# Patient Record
Sex: FEMALE | Race: WHITE | Employment: FULL TIME | URBAN - METROPOLITAN AREA
[De-identification: names, ages, dates, MRNs, and addresses within clinical notes are randomized per-mention and may not be internally consistent; named-entity substitution may affect disease eponyms.]

---

## 2017-06-20 PROBLEM — Z34.00 FIRST PREGNANCY: Status: ACTIVE | Noted: 2017-06-20

## 2017-09-19 PROBLEM — O35.9XX0 SUSPECTED FETAL ANOMALY, ANTEPARTUM: Status: ACTIVE | Noted: 2017-09-19

## 2017-09-19 PROBLEM — O09.92 SUPERVISION OF HIGH RISK PREGNANCY IN SECOND TRIMESTER: Status: ACTIVE | Noted: 2017-09-19

## 2017-12-28 ENCOUNTER — HOSPITAL ENCOUNTER (EMERGENCY)
Age: 27
Discharge: HOME | End: 2017-12-28
Attending: OBSTETRICS & GYNECOLOGY | Admitting: OBSTETRICS & GYNECOLOGY
Payer: COMMERCIAL

## 2017-12-28 VITALS
TEMPERATURE: 98.6 F | DIASTOLIC BLOOD PRESSURE: 75 MMHG | HEART RATE: 85 BPM | HEIGHT: 68 IN | RESPIRATION RATE: 18 BRPM | SYSTOLIC BLOOD PRESSURE: 127 MMHG | BODY MASS INDEX: 33.75 KG/M2

## 2017-12-28 DIAGNOSIS — O26.893 HEADACHE IN PREGNANCY, ANTEPARTUM, THIRD TRIMESTER: Primary | ICD-10-CM

## 2017-12-28 DIAGNOSIS — R51.9 HEADACHE IN PREGNANCY, ANTEPARTUM, THIRD TRIMESTER: Primary | ICD-10-CM

## 2017-12-28 LAB
ALBUMIN SERPL-MCNC: 2.3 G/DL (ref 3.5–5)
ALBUMIN/GLOB SERPL: 0.7 {RATIO} (ref 1.2–3.5)
ALP SERPL-CCNC: 151 U/L (ref 50–136)
ALT SERPL-CCNC: 20 U/L (ref 12–65)
ANION GAP SERPL CALC-SCNC: 13 MMOL/L (ref 7–16)
AST SERPL-CCNC: 17 U/L (ref 15–37)
BASOPHILS # BLD: 0 K/UL (ref 0–0.2)
BASOPHILS NFR BLD: 0 % (ref 0–2)
BILIRUB SERPL-MCNC: 0.2 MG/DL (ref 0.2–1.1)
BUN SERPL-MCNC: 6 MG/DL (ref 6–23)
CALCIUM SERPL-MCNC: 8.4 MG/DL (ref 8.3–10.4)
CHLORIDE SERPL-SCNC: 107 MMOL/L (ref 98–107)
CO2 SERPL-SCNC: 21 MMOL/L (ref 21–32)
CREAT SERPL-MCNC: 0.68 MG/DL (ref 0.6–1)
DIFFERENTIAL METHOD BLD: ABNORMAL
EOSINOPHIL # BLD: 0.1 K/UL (ref 0–0.8)
EOSINOPHIL NFR BLD: 1 % (ref 0.5–7.8)
ERYTHROCYTE [DISTWIDTH] IN BLOOD BY AUTOMATED COUNT: 12.6 % (ref 11.9–14.6)
GLOBULIN SER CALC-MCNC: 3.5 G/DL (ref 2.3–3.5)
GLUCOSE SERPL-MCNC: 106 MG/DL (ref 65–100)
HCT VFR BLD AUTO: 31.9 % (ref 35.8–46.3)
HGB BLD-MCNC: 10.8 G/DL (ref 11.7–15.4)
IMM GRANULOCYTES # BLD: 0 K/UL (ref 0–0.5)
IMM GRANULOCYTES NFR BLD AUTO: 1 % (ref 0–5)
LYMPHOCYTES # BLD: 1.8 K/UL (ref 0.5–4.6)
LYMPHOCYTES NFR BLD: 20 % (ref 13–44)
MCH RBC QN AUTO: 30.4 PG (ref 26.1–32.9)
MCHC RBC AUTO-ENTMCNC: 33.9 G/DL (ref 31.4–35)
MCV RBC AUTO: 89.9 FL (ref 79.6–97.8)
MONOCYTES # BLD: 0.6 K/UL (ref 0.1–1.3)
MONOCYTES NFR BLD: 7 % (ref 4–12)
NEUTS SEG # BLD: 6.2 K/UL (ref 1.7–8.2)
NEUTS SEG NFR BLD: 71 % (ref 43–78)
PLATELET # BLD AUTO: 171 K/UL (ref 150–450)
PMV BLD AUTO: 10.3 FL (ref 10.8–14.1)
POTASSIUM SERPL-SCNC: 3.8 MMOL/L (ref 3.5–5.1)
PROT SERPL-MCNC: 5.8 G/DL (ref 6.3–8.2)
RBC # BLD AUTO: 3.55 M/UL (ref 4.05–5.25)
SODIUM SERPL-SCNC: 141 MMOL/L (ref 136–145)
URATE SERPL-MCNC: 3.8 MG/DL (ref 2.6–6)
WBC # BLD AUTO: 8.6 K/UL (ref 4.3–11.1)

## 2017-12-28 PROCEDURE — 99285 EMERGENCY DEPT VISIT HI MDM: CPT

## 2017-12-28 PROCEDURE — 59025 FETAL NON-STRESS TEST: CPT

## 2017-12-28 PROCEDURE — 84550 ASSAY OF BLOOD/URIC ACID: CPT | Performed by: OBSTETRICS & GYNECOLOGY

## 2017-12-28 PROCEDURE — 80053 COMPREHEN METABOLIC PANEL: CPT | Performed by: OBSTETRICS & GYNECOLOGY

## 2017-12-28 PROCEDURE — 85025 COMPLETE CBC W/AUTO DIFF WBC: CPT | Performed by: OBSTETRICS & GYNECOLOGY

## 2017-12-28 RX ORDER — HYDROCODONE BITARTRATE AND ACETAMINOPHEN 5; 325 MG/1; MG/1
1 TABLET ORAL
Qty: 20 TAB | Refills: 0 | Status: SHIPPED | OUTPATIENT
Start: 2017-12-28 | End: 2018-01-18

## 2017-12-28 NOTE — PROGRESS NOTES
24 hour urine process explained to patient and spouse  Patient to start collection in AM and discard first void and collect urine X 24 hours after. Patient to return 24 hour urine collect Saturday morning to [facility] lab. OB hospitalist to follow up on results  Urine jug and urine hat given.   V/U of process    Awaiting lab results

## 2017-12-28 NOTE — IP AVS SNAPSHOT
[address] 
[phone] Patient: Germán Pearson MRN: [id number] SRE:[id number] My Medications TAKE these medications as instructed Instructions Each Dose to Equal  
 Morning Noon Evening Bedtime  
 aspirin delayed-release 81 mg tablet Your last dose was: Your next dose is: Take  by mouth daily. butalbital-acetaminophen-caffeine -40 mg per tablet Commonly known as:  Lavella Amble Your last dose was: Your next dose is: Take 1-2 Tabs by mouth every six (6) hours as needed for Pain. Max Daily Amount: 8 Tabs. 1-2 Tab HYDROcodone-acetaminophen 5-325 mg per tablet Commonly known as:  Gaye Arts Your last dose was: Your next dose is: Take 1 Tab by mouth every four (4) hours as needed for Pain (headache). Max Daily Amount: 6 Tabs. Indications: Pain 1 Tab MIRALAX 17 gram packet Generic drug:  polyethylene glycol Your last dose was: Your next dose is: Take 17 g by mouth daily. 17 g PNV 67-iron ps-folate no. 1-dha 29 mg iron- 1 mg-200 mg Cap Commonly known as:  Charlesetta Pata Your last dose was: Your next dose is: Take 1 Cap by mouth daily. 1 Cap TYLENOL 325 mg tablet Generic drug:  acetaminophen Your last dose was: Your next dose is: Take  by mouth every four (4) hours as needed for Pain. VITAMIN D3 2,000 unit Tab Generic drug:  cholecalciferol (vitamin D3) Your last dose was: Your next dose is: Take  by mouth. Where to Get Your Medications Information on where to get these meds will be given to you by the nurse or doctor. ! Ask your nurse or doctor about these medications HYDROcodone-acetaminophen 5-325 mg per tablet

## 2017-12-28 NOTE — IP AVS SNAPSHOT
Summary of Care Report The Summary of Care report has been created to help improve care coordination. Users with access to [employer] or [address] (Web-based application) may access additional patient information including the Discharge Summary. If you are not currently a [address] user and need more information, please call the number listed below in the [place] [address] section and ask to be connected with Medical Records. Facility Information Name Address Phone [address] [place] [address] [address] [place] [id number] [phone] Patient Information Patient Name Sex NOY Sidhu ([id number]) Female 1990 Discharge Information Admitting Provider Service Area Unit Gloria Srinivasan MD / [address] [facility] [address] / [phone] Discharge Provider Discharge Date/Time Discharge Disposition Destination (none) 2017 (Pending) AHR (none) Patient Language Language ENGLISH [13] Hospital Problems as of 2017  Reviewed: 2017 10:21 AM by Merly Craft MD  
  
  
  
 Class Noted - Resolved Last Modified POA Active Problems Headache in pregnancy, antepartum, third trimester  2017 - Present 2017 by Gloria Srinivasan MD Unknown Entered by Gloria Srinivasan MD  
  
Non-Hospital Problems as of 2017  Reviewed: 2017 10:21 AM by Merly Craft MD  
  
  
  
 Class Noted - Resolved Last Modified Active Problems First pregnancy  2017 - Present 2017 by Merly Craft MD  
  Entered by Selma Jhaveri MD  
  Overview Addendum 2017  9:09 AM by Selma Jhaveri MD  
   1)hx anxiety/depression. Never found a med that worked well 2)hx anemia. Possibly due to heavy menses 3)prenatal labs nl 
4)breast reduction 5)started baby asa and D   Stop asa at [facility] Supervision of high risk pregnancy in second trimester  9/19/2017 - Present 9/19/2017 by Thanh Sotelo MD  
  Entered by Ca Echavarria Overview Addendum 9/19/2017 10:14 AM by Ca Echavarria 9/19/2017 at [facility]:  Pt for consult: positive EIF on ultrasound in OB office. Suspected fetal anomaly, antepartum  9/19/2017 - Present 12/8/2017 by Rafael German MD  
  Entered by Ca Echavarria Overview Addendum 12/8/2017  9:04 AM by Rafael German MD  
   9/19/2017 at [facility]: Sent for 2 EIFs, AFP Tetra Screen Negative on 8/29/2017. AC 84%, UA Dopplers WNL. Normal anatomy and normal heart except EIF in both vents. CL  4.2. Fetal echo done due to echogenic papillary muscle noted on ultrasound that changes the risk of Down's Syndrome from 1/990 to 1/500. Genetic counseling performed, Declines genetic testing. Questionable EDC, please review, EDC based on fetal measurements ahead. I reviewed. We have good early dating. I don't see that anything needs to be changed. · No MFM f/u recommended. You are allergic to the following Allergen Reactions Pcn (Penicillins) Hives Current Discharge Medication List  
  
START taking these medications Dose & Instructions Dispensing Information Comments HYDROcodone-acetaminophen 5-325 mg per tablet Commonly known as:  Cheryl Kind Dose:  1 Tab Take 1 Tab by mouth every four (4) hours as needed for Pain (headache). Max Daily Amount: 6 Tabs. Indications: Pain Quantity:  20 Tab Refills:  0 CONTINUE these medications which have NOT CHANGED Dose & Instructions Dispensing Information Comments  
 aspirin delayed-release 81 mg tablet Take  by mouth daily. Refills:  0  
   
 butalbital-acetaminophen-caffeine -40 mg per tablet Commonly known as:  Othelia Rising Dose:  1-2 Tab Take 1-2 Tabs by mouth every six (6) hours as needed for Pain. Max Daily Amount: 8 Tabs. Quantity:  30 Tab Refills:  2 MIRALAX 17 gram packet Generic drug:  polyethylene glycol Dose:  17 g Take 17 g by mouth daily. Refills:  0  
   
 PNV 67-iron ps-folate no. 1-dha 29 mg iron- 1 mg-200 mg Cap Commonly known as:  Shon Lint Dose:  1 Cap Take 1 Cap by mouth daily. Quantity:  30 Cap Refills:  11 TYLENOL 325 mg tablet Generic drug:  acetaminophen Take  by mouth every four (4) hours as needed for Pain. Refills:  0  
   
 VITAMIN D3 2,000 unit Tab Generic drug:  cholecalciferol (vitamin D3) Take  by mouth. Refills:  0 Follow-up Information Follow up With Details Comments Contact Info Phys Other, MD   Patient can only remember the practice name and not the physician Discharge Instructions High Blood Pressure in Pregnancy: Care Instructions Your Care Instructions High blood pressure (hypertension) means that the force of blood against your artery walls is too strong. Mild high blood pressure during pregnancy is not usually dangerous. Your doctor will probably just want to watch you closely. But when blood pressure is very high, it can reduce oxygen to your baby. This can affect how well your baby grows. High blood pressure also means that you are at higher risk for: · Preeclampsia. This is a problem that includes high blood pressure and damage to your liver or kidneys. It can also reduce how much oxygen your baby gets. In some cases, it leads to eclampsia. Eclampsia causes seizures. · Placental abruption. This is a problem when the placenta separates from the uterus before birth. It prevents the baby from getting enough oxygen and nutrients. Sometimes it can cause death for the baby and the mother.  
To prevent problems for you or your baby, you will have to check your blood pressure often. You will do this until after your baby is born. If your blood pressure rises suddenly or is very high during your pregnancy, your doctor may prescribe medicines. They can usually control blood pressure. If your blood pressure affects your or your baby's health, your doctor may need to deliver your baby early. After your baby is born, your blood pressure will probably improve. But sometimes blood pressure problems continue after birth. Follow-up care is a key part of your treatment and safety. Be sure to make and go to all appointments, and call your doctor if you are having problems. It's also a good idea to know your test results and keep a list of the medicines you take. How can you care for yourself at home? · Take and write down your blood pressure at home if your doctor tells you to. · Take your medicines exactly as prescribed. Call your doctor if you think you are having a problem with your medicine. · Do not smoke. If you need help quitting, talk to your doctor about stop-smoking programs and medicines. These can increase your chances of quitting for good. · Do not gain too much weight during your pregnancy. Talk to your doctor about how much weight gain is healthy. · Eat a healthy diet. · If your doctor says it's okay, get regular exercise. Walking or swimming several times a week can be healthy for you and your baby. · Reduce stress, and find time to relax. This is very important if you continue to work or have a busy schedule. It's also important if you have small children at home. When should you call for help? Call 911 anytime you think you may need emergency care. For example, call if: 
? · You passed out (lost consciousness). ? · You have a seizure. ?Call your doctor now or seek immediate medical care if: 
? · You have symptoms of preeclampsia, such as: 
¨ Sudden swelling of your face, hands, or feet. ¨ New vision problems (such as dimness or blurring). ¨ A severe headache. ? · Your blood pressure is higher than it should be or rises suddenly. ? · You have new nausea or vomiting. ? · You think that you are in labor. ? · You have pain in your belly or pelvis. ? Watch closely for changes in your health, and be sure to contact your doctor if: 
? · You gain weight rapidly. Where can you learn more? Go to http://edmar-werner.info/. Enter [phone] in the search box to learn more about \"High Blood Pressure in Pregnancy: Care Instructions. \" Current as of: 2017 Content Version: 11.4 © 6326-2368 [employer]. Care instructions adapted under license by [employer] (which disclaims liability or warranty for this information). If you have questions about a medical condition or this instruction, always ask your healthcare professional. [address] any warranty or liability for your use of this information. Pregnancy Precautions: Care Instructions Your Care Instructions There is no sure way to prevent labor before your due date ( labor) or to prevent most other pregnancy problems. But there are things you can do to increase your chances of a healthy pregnancy. Go to your appointments, follow your doctor's advice, and take good care of yourself. Eat well, and exercise (if your doctor agrees). And make sure to drink plenty of water. Follow-up care is a key part of your treatment and safety. Be sure to make and go to all appointments, and call your doctor if you are having problems. It's also a good idea to know your test results and keep a list of the medicines you take. How can you care for yourself at home? · Make sure you go to your prenatal appointments. At each visit, your doctor will check your blood pressure. Your doctor will also check to see if you have protein in your urine.  High blood pressure and protein in urine are signs of preeclampsia. This condition can be dangerous for you and your baby. · Drink plenty of fluids, enough so that your urine is light yellow or clear like water. Dehydration can cause contractions. If you have kidney, heart, or liver disease and have to limit fluids, talk with your doctor before you increase the amount of fluids you drink. · Tell your doctor right away if you notice any symptoms of an infection, such as: ¨ Burning when you urinate. ¨ A foul-smelling discharge from your vagina. ¨ Vaginal itching. ¨ Unexplained fever. ¨ Unusual pain or soreness in your uterus or lower belly. · Eat a balanced diet. Include plenty of foods that are high in calcium and iron. ¨ Foods high in calcium include milk, cheese, yogurt, almonds, and broccoli. ¨ Foods high in iron include red meat, shellfish, poultry, eggs, beans, raisins, whole-grain bread, and leafy green vegetables. · Do not smoke. If you need help quitting, talk to your doctor about stop-smoking programs and medicines. These can increase your chances of quitting for good. · Do not drink alcohol or use illegal drugs. · Follow your doctor's directions about activity. Your doctor will let you know how much, if any, exercise you can do. · Ask your doctor if you can have sex. If you are at risk for early labor, your doctor may ask you to not have sex. · Take care to prevent falls. During pregnancy, your joints are loose, and your balance is off. Sports such as bicycling, skiing, or in-line skating can increase your risk of falling. And don't ride horses or motorcycles, dive, water ski, scuba dive, or parachute jump while you are pregnant. · Avoid getting very hot. Do not use saunas or hot tubs. Avoid staying out in the sun in hot weather for long periods. Take acetaminophen (Tylenol) to lower a high fever.  
· Do not take any over-the-counter or herbal medicines or supplements without talking to your doctor or pharmacist first. 
 When should you call for help? Call 911 anytime you think you may need emergency care. For example, call if: 
? · You passed out (lost consciousness). ? · You have severe vaginal bleeding. ? · You have severe pain in your belly or pelvis. ? · You have had fluid gushing or leaking from your vagina and you know or think the umbilical cord is bulging into your vagina. If this happens, immediately get down on your knees so your rear end (buttocks) is higher than your head. This will decrease the pressure on the cord until help arrives. ?Call your doctor now or seek immediate medical care if: 
? · You have signs of preeclampsia, such as: 
¨ Sudden swelling of your face, hands, or feet. ¨ New vision problems (such as dimness or blurring). ¨ A severe headache. ? · You have any vaginal bleeding. ? · You have belly pain or cramping. ? · You have a fever. ? · You have had regular contractions (with or without pain) for an hour. This means that you have 8 or more within 1 hour or 4 or more in 20 minutes after you change your position and drink fluids. ? · You have a sudden release of fluid from your vagina. ? · You have low back pain or pelvic pressure that does not go away. ? · You notice that your baby has stopped moving or is moving much less than normal. ? Watch closely for changes in your health, and be sure to contact your doctor if you have any problems. Where can you learn more? Go to http://edmar-werner.info/. Enter [id number] in the search box to learn more about \"Pregnancy Precautions: Care Instructions. \" Current as of: March 16, 2017 Content Version: 11.4 © 3209-2266 [employer]. Care instructions adapted under license by [employer] (which disclaims liability or warranty for this information).  If you have questions about a medical condition or this instruction, always ask your healthcare professional. [employer] disclaims any warranty or liability for your use of this information. Chart Review Routing History No Routing History on File

## 2017-12-28 NOTE — ED PROVIDER NOTES
Chief Complaint:headache and swelling      33 y.o. female at 35w1d  weeks gestation who is seen for swelling and headache. Pt reports 15 pound weight gain over past 2 weeks. Headache not relieved with fioricett. No shortness of breath or cough, Some vomiting at night. No abd pain. No loss of fluid or vag bleeding. No contractions. No visual changes. HISTORY:    History   Sexual Activity    Sexual activity: Yes    Partners: Male    Birth control/ protection: None     Patient's last menstrual period was 04/26/2017 (exact date). Social History     Social History    Marital status: SINGLE     Spouse name: N/A    Number of children: N/A    Years of education: N/A     Occupational History    Not on file. Social History Main Topics    Smoking status: Never Smoker    Smokeless tobacco: Never Used    Alcohol use No    Drug use: No    Sexual activity: Yes     Partners: Male     Birth control/ protection: None     Other Topics Concern    Not on file     Social History Narrative       Past Surgical History:   Procedure Laterality Date    BREAST SURGERY PROCEDURE UNLISTED      HX BREAST REDUCTION         Past Medical History:   Diagnosis Date    Anemia     before pregnancy     Anxiety attack     Calculus of kidney     Depression     History of - none now    History of chicken pox          ROS:  A 12 point review of symptoms negative except for chief complaint as described above. PHYSICAL EXAM:  Blood pressure 134/71, pulse 78, temperature 98.6 °F (37 °C), resp. rate 18, height 5' 8\" (1.727 m), last menstrual period 04/26/2017. 124/76  Constitutional: The patient appears well, alert, oriented x 3. Cardiovascular: Heart RRR, no murmurs.    Respiratory: Lungs clear, no respiratory distress  GI: Abdomen soft, nontender, no guarding  No fundal tenderness  Musculoskeletal: no cva tenderness  Upper ext: +1 edema, reflexes +2  Lower ext: +1 edema, neg yoel's, reflexes +2  Skin: no rashes or lesions  Psychiatric:Mood/ Affect: appropriate  Genitourinary: SVE:deferred  FHT:reactive nst; moderate variability, accels  TOCO:no contractions    Urine dip neg protein    I personally reviewed pt's medical record including relevant labs     Recent Results (from the past 12 hour(s))   CBC WITH AUTOMATED DIFF    Collection Time: 12/28/17  4:09 PM   Result Value Ref Range    WBC 8.6 4.3 - 11.1 K/uL    RBC 3.55 (L) 4.05 - 5.25 M/uL    HGB 10.8 (L) 11.7 - 15.4 g/dL    HCT 31.9 (L) 35.8 - 46.3 %    MCV 89.9 79.6 - 97.8 FL    MCH 30.4 26.1 - 32.9 PG    MCHC 33.9 31.4 - 35.0 g/dL    RDW 12.6 11.9 - 14.6 %    PLATELET 384 505 - 481 K/uL    MPV 10.3 (L) 10.8 - 14.1 FL    DF AUTOMATED      NEUTROPHILS 71 43 - 78 %    LYMPHOCYTES 20 13 - 44 %    MONOCYTES 7 4.0 - 12.0 %    EOSINOPHILS 1 0.5 - 7.8 %    BASOPHILS 0 0.0 - 2.0 %    IMMATURE GRANULOCYTES 1 0.0 - 5.0 %    ABS. NEUTROPHILS 6.2 1.7 - 8.2 K/UL    ABS. LYMPHOCYTES 1.8 0.5 - 4.6 K/UL    ABS. MONOCYTES 0.6 0.1 - 1.3 K/UL    ABS. EOSINOPHILS 0.1 0.0 - 0.8 K/UL    ABS. BASOPHILS 0.0 0.0 - 0.2 K/UL    ABS. IMM. GRANS. 0.0 0.0 - 0.5 K/UL   METABOLIC PANEL, COMPREHENSIVE    Collection Time: 12/28/17  4:09 PM   Result Value Ref Range    Sodium 141 136 - 145 mmol/L    Potassium 3.8 3.5 - 5.1 mmol/L    Chloride 107 98 - 107 mmol/L    CO2 21 21 - 32 mmol/L    Anion gap 13 7 - 16 mmol/L    Glucose 106 (H) 65 - 100 mg/dL    BUN 6 6 - 23 MG/DL    Creatinine 0.68 0.6 - 1.0 MG/DL    GFR est AA >60 >60 ml/min/1.73m2    GFR est non-AA >60 >60 ml/min/1.73m2    Calcium 8.4 8.3 - 10.4 MG/DL    Bilirubin, total 0.2 0.2 - 1.1 MG/DL    ALT (SGPT) 20 12 - 65 U/L    AST (SGOT) 17 15 - 37 U/L    Alk.  phosphatase 151 (H) 50 - 136 U/L    Protein, total 5.8 (L) 6.3 - 8.2 g/dL    Albumin 2.3 (L) 3.5 - 5.0 g/dL    Globulin 3.5 2.3 - 3.5 g/dL    A-G Ratio 0.7 (L) 1.2 - 3.5     URIC ACID    Collection Time: 12/28/17  4:09 PM   Result Value Ref Range    Uric acid 3.8 2.6 - 6.0 MG/DL Assessment/Plan:    32 yo G1 at 35w1d with 15 pound weight gain in 2 weeks and headache not relieved with fioricett  No evidence pre-eclampsia based on neg urine protein, normal bp, normal uric acid  rx vicodin 5/325 #20  Will do 24 hour protein  Keep appt in one week or sooner if needed  Precautions given

## 2017-12-28 NOTE — IP AVS SNAPSHOT
[place] [id number] [facility] 
[phone] Patient: Paz Page MRN: [id number] About your hospitalization You were admitted on:  N/A You last received care in the:  [facility] [place] [facility] You were discharged on:  December 28, 2017 Why you were hospitalized Your primary diagnosis was:  Not on File Your diagnoses also included:  Headache In Pregnancy, Antepartum, Third Trimester Things You Need To Do (next 8 weeks) Follow up with Jaswant Escalona MD  
  
Where:  Patient can only remember the practice name and not the physician Thursday Jan 04, 2018 Return OB with Marleny Monroy MD at  2:30 PM  
Where:  [facility] ([facility]) Discharge Orders None A check franklin indicates which time of day the medication should be taken. My Medications TAKE these medications as instructed Instructions Each Dose to Equal  
 Morning Noon Evening Bedtime  
 aspirin delayed-release 81 mg tablet Your last dose was: Your next dose is: Take  by mouth daily. butalbital-acetaminophen-caffeine -40 mg per tablet Commonly known as:  Sheria Loser Your last dose was: Your next dose is: Take 1-2 Tabs by mouth every six (6) hours as needed for Pain. Max Daily Amount: 8 Tabs. 1-2 Tab HYDROcodone-acetaminophen 5-325 mg per tablet Commonly known as:  Siomara Mura Your last dose was: Your next dose is: Take 1 Tab by mouth every four (4) hours as needed for Pain (headache). Max Daily Amount: 6 Tabs. Indications: Pain 1 Tab MIRALAX 17 gram packet Generic drug:  polyethylene glycol Your last dose was: Your next dose is: Take 17 g by mouth daily. 17 g PNV 67-iron ps-folate no. 1-dha 29 mg iron- 1 mg-200 mg Cap Commonly known as:  Bharat Jermaine Your last dose was: Your next dose is: Take 1 Cap by mouth daily. 1 Cap TYLENOL 325 mg tablet Generic drug:  acetaminophen Your last dose was: Your next dose is: Take  by mouth every four (4) hours as needed for Pain. VITAMIN D3 2,000 unit Tab Generic drug:  cholecalciferol (vitamin D3) Your last dose was: Your next dose is: Take  by mouth. Where to Get Your Medications Information on where to get these meds will be given to you by the nurse or doctor. ! Ask your nurse or doctor about these medications HYDROcodone-acetaminophen 5-325 mg per tablet Discharge Instructions High Blood Pressure in Pregnancy: Care Instructions Your Care Instructions High blood pressure (hypertension) means that the force of blood against your artery walls is too strong. Mild high blood pressure during pregnancy is not usually dangerous. Your doctor will probably just want to watch you closely. But when blood pressure is very high, it can reduce oxygen to your baby. This can affect how well your baby grows. High blood pressure also means that you are at higher risk for: · Preeclampsia. This is a problem that includes high blood pressure and damage to your liver or kidneys. It can also reduce how much oxygen your baby gets. In some cases, it leads to eclampsia. Eclampsia causes seizures. · Placental abruption. This is a problem when the placenta separates from the uterus before birth. It prevents the baby from getting enough oxygen and nutrients. Sometimes it can cause death for the baby and the mother.  
To prevent problems for you or your baby, you will have to check your blood pressure often. You will do this until after your baby is born. If your blood pressure rises suddenly or is very high during your pregnancy, your doctor may prescribe medicines. They can usually control blood pressure. If your blood pressure affects your or your baby's health, your doctor may need to deliver your baby early. After your baby is born, your blood pressure will probably improve. But sometimes blood pressure problems continue after birth. Follow-up care is a key part of your treatment and safety. Be sure to make and go to all appointments, and call your doctor if you are having problems. It's also a good idea to know your test results and keep a list of the medicines you take. How can you care for yourself at home? · Take and write down your blood pressure at home if your doctor tells you to. · Take your medicines exactly as prescribed. Call your doctor if you think you are having a problem with your medicine. · Do not smoke. If you need help quitting, talk to your doctor about stop-smoking programs and medicines. These can increase your chances of quitting for good. · Do not gain too much weight during your pregnancy. Talk to your doctor about how much weight gain is healthy. · Eat a healthy diet. · If your doctor says it's okay, get regular exercise. Walking or swimming several times a week can be healthy for you and your baby. · Reduce stress, and find time to relax. This is very important if you continue to work or have a busy schedule. It's also important if you have small children at home. When should you call for help? Call 911 anytime you think you may need emergency care. For example, call if: 
? · You passed out (lost consciousness). ? · You have a seizure. ?Call your doctor now or seek immediate medical care if: 
? · You have symptoms of preeclampsia, such as: 
¨ Sudden swelling of your face, hands, or feet. ¨ New vision problems (such as dimness or blurring). ¨ A severe headache. ? · Your blood pressure is higher than it should be or rises suddenly. ? · You have new nausea or vomiting. ? · You think that you are in labor. ? · You have pain in your belly or pelvis. ? Watch closely for changes in your health, and be sure to contact your doctor if: 
? · You gain weight rapidly. Where can you learn more? Go to http://edmar-werner.info/. Enter [phone] in the search box to learn more about \"High Blood Pressure in Pregnancy: Care Instructions. \" Current as of: 2017 Content Version: 11.4 © 3389-9187 Pearescope. Care instructions adapted under license by 360Learning (which disclaims liability or warranty for this information). If you have questions about a medical condition or this instruction, always ask your healthcare professional. [address] any warranty or liability for your use of this information. Pregnancy Precautions: Care Instructions Your Care Instructions There is no sure way to prevent labor before your due date ( labor) or to prevent most other pregnancy problems. But there are things you can do to increase your chances of a healthy pregnancy. Go to your appointments, follow your doctor's advice, and take good care of yourself. Eat well, and exercise (if your doctor agrees). And make sure to drink plenty of water. Follow-up care is a key part of your treatment and safety. Be sure to make and go to all appointments, and call your doctor if you are having problems. It's also a good idea to know your test results and keep a list of the medicines you take. How can you care for yourself at home? · Make sure you go to your prenatal appointments. At each visit, your doctor will check your blood pressure. Your doctor will also check to see if you have protein in your urine.  High blood pressure and protein in urine are signs of preeclampsia. This condition can be dangerous for you and your baby. · Drink plenty of fluids, enough so that your urine is light yellow or clear like water. Dehydration can cause contractions. If you have kidney, heart, or liver disease and have to limit fluids, talk with your doctor before you increase the amount of fluids you drink. · Tell your doctor right away if you notice any symptoms of an infection, such as: ¨ Burning when you urinate. ¨ A foul-smelling discharge from your vagina. ¨ Vaginal itching. ¨ Unexplained fever. ¨ Unusual pain or soreness in your uterus or lower belly. · Eat a balanced diet. Include plenty of foods that are high in calcium and iron. ¨ Foods high in calcium include milk, cheese, yogurt, almonds, and broccoli. ¨ Foods high in iron include red meat, shellfish, poultry, eggs, beans, raisins, whole-grain bread, and leafy green vegetables. · Do not smoke. If you need help quitting, talk to your doctor about stop-smoking programs and medicines. These can increase your chances of quitting for good. · Do not drink alcohol or use illegal drugs. · Follow your doctor's directions about activity. Your doctor will let you know how much, if any, exercise you can do. · Ask your doctor if you can have sex. If you are at risk for early labor, your doctor may ask you to not have sex. · Take care to prevent falls. During pregnancy, your joints are loose, and your balance is off. Sports such as bicycling, skiing, or in-line skating can increase your risk of falling. And don't ride horses or motorcycles, dive, water ski, scuba dive, or parachute jump while you are pregnant. · Avoid getting very hot. Do not use saunas or hot tubs. Avoid staying out in the sun in hot weather for long periods. Take acetaminophen (Tylenol) to lower a high fever.  
· Do not take any over-the-counter or herbal medicines or supplements without talking to your doctor or pharmacist first. 
 When should you call for help? Call 911 anytime you think you may need emergency care. For example, call if: 
? · You passed out (lost consciousness). ? · You have severe vaginal bleeding. ? · You have severe pain in your belly or pelvis. ? · You have had fluid gushing or leaking from your vagina and you know or think the umbilical cord is bulging into your vagina. If this happens, immediately get down on your knees so your rear end (buttocks) is higher than your head. This will decrease the pressure on the cord until help arrives. ?Call your doctor now or seek immediate medical care if: 
? · You have signs of preeclampsia, such as: 
¨ Sudden swelling of your face, hands, or feet. ¨ New vision problems (such as dimness or blurring). ¨ A severe headache. ? · You have any vaginal bleeding. ? · You have belly pain or cramping. ? · You have a fever. ? · You have had regular contractions (with or without pain) for an hour. This means that you have 8 or more within 1 hour or 4 or more in 20 minutes after you change your position and drink fluids. ? · You have a sudden release of fluid from your vagina. ? · You have low back pain or pelvic pressure that does not go away. ? · You notice that your baby has stopped moving or is moving much less than normal. ? Watch closely for changes in your health, and be sure to contact your doctor if you have any problems. Where can you learn more? Go to http://edmar-werner.info/. Enter [id number] in the search box to learn more about \"Pregnancy Precautions: Care Instructions. \" Current as of: March 16, 2017 Content Version: 11.4 © 0282-2430 SpareFoot. Care instructions adapted under license by Elo7 (which disclaims liability or warranty for this information).  If you have questions about a medical condition or this instruction, always ask your healthcare professional. [employer] disclaims any warranty or liability for your use of this information. Introducing [facility]! Dear Jona Walker: 
Thank you for requesting a Business Engine account. Our records indicate that you already have an active Business Engine account. You can access your account anytime at [email or web address] Did you know that you can access your hospital and ER discharge instructions at any time in Business Engine? You can also review all of your test results from your hospital stay or ER visit. Additional Information If you have questions, please visit the Frequently Asked Questions section of the Business Engine website at [email or web address] Remember, Business Engine is NOT to be used for urgent needs. For medical emergencies, dial 911. Now available from your iPhone and Android! Providers Seen During Your Hospitalization Provider Specialty Primary office phone Thi Haro MD Obstetrics & Gynecology [phone] Your Primary Care Physician (PCP) Primary Care Physician Office Phone Office Fax OTHER, PHYS ** None ** ** None ** You are allergic to the following Allergen Reactions Pcn (Penicillins) Hives Recent Documentation Height BMI OB Status Smoking Status 1.727 m 33.75 kg/m2 Pregnant Never Smoker Emergency Contacts Name Discharge Info Relation Home Work Mobile Luis Felipe Blankenship  Spouse [3] [phone] Patient Belongings The following personal items are in your possession at time of discharge: 
                             
 
  
  
 Please provide this summary of care documentation to your next provider. Signatures-by signing, you are acknowledging that this After Visit Summary has been reviewed with you and you have received a copy. Patient Signature:  ____________________________________________________________ Date:  ____________________________________________________________  
  
Burlene Unruly Provider Signature:  ____________________________________________________________ Date:  ____________________________________________________________

## 2017-12-28 NOTE — DISCHARGE INSTRUCTIONS
High Blood Pressure in Pregnancy: Care Instructions  Your Care Instructions    High blood pressure (hypertension) means that the force of blood against your artery walls is too strong. Mild high blood pressure during pregnancy is not usually dangerous. Your doctor will probably just want to watch you closely. But when blood pressure is very high, it can reduce oxygen to your baby. This can affect how well your baby grows. High blood pressure also means that you are at higher risk for:  · Preeclampsia. This is a problem that includes high blood pressure and damage to your liver or kidneys. It can also reduce how much oxygen your baby gets. In some cases, it leads to eclampsia. Eclampsia causes seizures. · Placental abruption. This is a problem when the placenta separates from the uterus before birth. It prevents the baby from getting enough oxygen and nutrients. Sometimes it can cause death for the baby and the mother. To prevent problems for you or your baby, you will have to check your blood pressure often. You will do this until after your baby is born. If your blood pressure rises suddenly or is very high during your pregnancy, your doctor may prescribe medicines. They can usually control blood pressure. If your blood pressure affects your or your baby's health, your doctor may need to deliver your baby early. After your baby is born, your blood pressure will probably improve. But sometimes blood pressure problems continue after birth. Follow-up care is a key part of your treatment and safety. Be sure to make and go to all appointments, and call your doctor if you are having problems. It's also a good idea to know your test results and keep a list of the medicines you take. How can you care for yourself at home? · Take and write down your blood pressure at home if your doctor tells you to. · Take your medicines exactly as prescribed.  Call your doctor if you think you are having a problem with your medicine. · Do not smoke. If you need help quitting, talk to your doctor about stop-smoking programs and medicines. These can increase your chances of quitting for good. · Do not gain too much weight during your pregnancy. Talk to your doctor about how much weight gain is healthy. · Eat a healthy diet. · If your doctor says it's okay, get regular exercise. Walking or swimming several times a week can be healthy for you and your baby. · Reduce stress, and find time to relax. This is very important if you continue to work or have a busy schedule. It's also important if you have small children at home. When should you call for help? Call 911 anytime you think you may need emergency care. For example, call if:  ? · You passed out (lost consciousness). ? · You have a seizure. ?Call your doctor now or seek immediate medical care if:  ? · You have symptoms of preeclampsia, such as:  ¨ Sudden swelling of your face, hands, or feet. ¨ New vision problems (such as dimness or blurring). ¨ A severe headache. ? · Your blood pressure is higher than it should be or rises suddenly. ? · You have new nausea or vomiting. ? · You think that you are in labor. ? · You have pain in your belly or pelvis. ? Watch closely for changes in your health, and be sure to contact your doctor if:  ? · You gain weight rapidly. Where can you learn more? Go to http://edmar-werner.info/. Enter [phone] in the search box to learn more about \"High Blood Pressure in Pregnancy: Care Instructions. \"  Current as of: March 16, 2017  Content Version: 11.4  © 7406-3320 Healthwise, Incorporated. Care instructions adapted under license by Qianmi (which disclaims liability or warranty for this information). If you have questions about a medical condition or this instruction, always ask your healthcare professional. [address] any warranty or liability for your use of this information. Pregnancy Precautions: Care Instructions  Your Care Instructions    There is no sure way to prevent labor before your due date ( labor) or to prevent most other pregnancy problems. But there are things you can do to increase your chances of a healthy pregnancy. Go to your appointments, follow your doctor's advice, and take good care of yourself. Eat well, and exercise (if your doctor agrees). And make sure to drink plenty of water. Follow-up care is a key part of your treatment and safety. Be sure to make and go to all appointments, and call your doctor if you are having problems. It's also a good idea to know your test results and keep a list of the medicines you take. How can you care for yourself at home? · Make sure you go to your prenatal appointments. At each visit, your doctor will check your blood pressure. Your doctor will also check to see if you have protein in your urine. High blood pressure and protein in urine are signs of preeclampsia. This condition can be dangerous for you and your baby. · Drink plenty of fluids, enough so that your urine is light yellow or clear like water. Dehydration can cause contractions. If you have kidney, heart, or liver disease and have to limit fluids, talk with your doctor before you increase the amount of fluids you drink. · Tell your doctor right away if you notice any symptoms of an infection, such as:  ¨ Burning when you urinate. ¨ A foul-smelling discharge from your vagina. ¨ Vaginal itching. ¨ Unexplained fever. ¨ Unusual pain or soreness in your uterus or lower belly. · Eat a balanced diet. Include plenty of foods that are high in calcium and iron. ¨ Foods high in calcium include milk, cheese, yogurt, almonds, and broccoli. ¨ Foods high in iron include red meat, shellfish, poultry, eggs, beans, raisins, whole-grain bread, and leafy green vegetables. · Do not smoke.  If you need help quitting, talk to your doctor about stop-smoking programs and medicines. These can increase your chances of quitting for good. · Do not drink alcohol or use illegal drugs. · Follow your doctor's directions about activity. Your doctor will let you know how much, if any, exercise you can do. · Ask your doctor if you can have sex. If you are at risk for early labor, your doctor may ask you to not have sex. · Take care to prevent falls. During pregnancy, your joints are loose, and your balance is off. Sports such as bicycling, skiing, or in-line skating can increase your risk of falling. And don't ride horses or motorcycles, dive, water ski, scuba dive, or parachute jump while you are pregnant. · Avoid getting very hot. Do not use saunas or hot tubs. Avoid staying out in the sun in hot weather for long periods. Take acetaminophen (Tylenol) to lower a high fever. · Do not take any over-the-counter or herbal medicines or supplements without talking to your doctor or pharmacist first.  When should you call for help? Call 911 anytime you think you may need emergency care. For example, call if:  ? · You passed out (lost consciousness). ? · You have severe vaginal bleeding. ? · You have severe pain in your belly or pelvis. ? · You have had fluid gushing or leaking from your vagina and you know or think the umbilical cord is bulging into your vagina. If this happens, immediately get down on your knees so your rear end (buttocks) is higher than your head. This will decrease the pressure on the cord until help arrives. ?Call your doctor now or seek immediate medical care if:  ? · You have signs of preeclampsia, such as:  ¨ Sudden swelling of your face, hands, or feet. ¨ New vision problems (such as dimness or blurring). ¨ A severe headache. ? · You have any vaginal bleeding. ? · You have belly pain or cramping. ? · You have a fever. ? · You have had regular contractions (with or without pain) for an hour.  This means that you have 8 or more within 1 hour or 4 or more in 20 minutes after you change your position and drink fluids. ? · You have a sudden release of fluid from your vagina. ? · You have low back pain or pelvic pressure that does not go away. ? · You notice that your baby has stopped moving or is moving much less than normal.   ? Watch closely for changes in your health, and be sure to contact your doctor if you have any problems. Where can you learn more? Go to http://edmar-werner.info/. Enter [id number] in the search box to learn more about \"Pregnancy Precautions: Care Instructions. \"  Current as of: March 16, 2017  Content Version: 11.4  © 9241-7371 Stayful. Care instructions adapted under license by DotNetNuke (which disclaims liability or warranty for this information). If you have questions about a medical condition or this instruction, always ask your healthcare professional. [address] any warranty or liability for your use of this information.

## 2017-12-28 NOTE — PROGRESS NOTES
Discharge instructions reviewed with patient, and given verbally and in writing. Patient to return to hospital with decreased fetal movement, leaking of fluid, bright red vaginal bleeding, regular painful contractions, consistent worsening headache, blurry vision, or epigastric pain.   Modified bedrest   Will return 24 hour urine Saturday  V/U

## 2017-12-28 NOTE — PROGRESS NOTES
Orders received for CBC, CMP, patient to collect 24 hour urine at home. May start 24 hour urine in AM and return to the lab at [address] [place] [address] Saturday morning for testing.     NST reactive  Will monitor BP's while awaiting lab results  Urine dip reviewed with Dr Eileen Cole

## 2017-12-28 NOTE — PROGRESS NOTES
States +FM, abdomen palpates soft nontender to touch. Denies LOF or vaginal bleeding. EFM and TOCO applied  Reports headaches but has a history of migraines on Fioricet. No blurry vision or epigastric pain  +2 pitting edema noted bilaterally, negative clonus.   Reports swelling started [place] day  BP on arrival 123/76 in sitting position

## 2017-12-28 NOTE — PROGRESS NOTES
Dr Ilsa Steward back at bs, MD has reviewed labs, and BP  Will discharge home with 24 hour, modified bedrest, and prescription for Norco for headache  Patient to follow up with office appointment January 4th at [facility].   Will return to hospital with worsening symptoms

## 2017-12-30 ENCOUNTER — HOSPITAL ENCOUNTER (OUTPATIENT)
Dept: LAB | Age: 27
Discharge: HOME | End: 2017-12-30
Payer: COMMERCIAL

## 2017-12-30 LAB
COLLECT DURATION TIME UR: 24 HR
COLLECT DURATION TIME UR: 24 HR
CREAT 24H UR-MRATE: 2110.7 MG/24 HR
CREAT UR-MCNC: 58.63 MG/DL
PROT 24H UR-MRATE: 648 MG/24HR
PROT UR-MCNC: 18 MG/DL
SPECIMEN VOL ?TM UR: 3600 ML
SPECIMEN VOL ?TM UR: 3600 ML

## 2017-12-30 PROCEDURE — 82570 ASSAY OF URINE CREATININE: CPT | Performed by: OBSTETRICS & GYNECOLOGY

## 2017-12-30 PROCEDURE — 84156 ASSAY OF PROTEIN URINE: CPT | Performed by: OBSTETRICS & GYNECOLOGY

## 2018-01-06 PROBLEM — O12.13 PROTEINURIA AFFECTING PREGNANCY IN THIRD TRIMESTER: Status: ACTIVE | Noted: 2018-01-06

## 2018-01-11 ENCOUNTER — HOSPITAL ENCOUNTER (OUTPATIENT)
Age: 28
Discharge: HOME | End: 2018-01-11
Attending: OBSTETRICS & GYNECOLOGY | Admitting: OBSTETRICS & GYNECOLOGY
Payer: COMMERCIAL

## 2018-01-11 VITALS
TEMPERATURE: 98.2 F | WEIGHT: 221 LBS | RESPIRATION RATE: 18 BRPM | HEART RATE: 84 BPM | BODY MASS INDEX: 33.49 KG/M2 | DIASTOLIC BLOOD PRESSURE: 78 MMHG | SYSTOLIC BLOOD PRESSURE: 126 MMHG | HEIGHT: 68 IN

## 2018-01-11 DIAGNOSIS — R51.9 HEADACHE IN PREGNANCY, ANTEPARTUM, THIRD TRIMESTER: ICD-10-CM

## 2018-01-11 DIAGNOSIS — O26.893 HEADACHE IN PREGNANCY, ANTEPARTUM, THIRD TRIMESTER: ICD-10-CM

## 2018-01-11 DIAGNOSIS — O12.13 PROTEINURIA AFFECTING PREGNANCY IN THIRD TRIMESTER: ICD-10-CM

## 2018-01-11 PROBLEM — O40.3XX0 POLYHYDRAMNIOS IN THIRD TRIMESTER: Status: ACTIVE | Noted: 2018-01-11

## 2018-01-11 LAB
ALBUMIN SERPL-MCNC: 2.5 G/DL (ref 3.5–5)
ALBUMIN/GLOB SERPL: 0.7 {RATIO} (ref 1.2–3.5)
ALP SERPL-CCNC: 193 U/L (ref 50–136)
ALT SERPL-CCNC: 18 U/L (ref 12–65)
ANION GAP SERPL CALC-SCNC: 16 MMOL/L (ref 7–16)
AST SERPL-CCNC: 16 U/L (ref 15–37)
BILIRUB SERPL-MCNC: 0.4 MG/DL (ref 0.2–1.1)
BUN SERPL-MCNC: 7 MG/DL (ref 6–23)
CALCIUM SERPL-MCNC: 8.5 MG/DL (ref 8.3–10.4)
CHLORIDE SERPL-SCNC: 106 MMOL/L (ref 98–107)
CO2 SERPL-SCNC: 17 MMOL/L (ref 21–32)
CREAT SERPL-MCNC: 0.54 MG/DL (ref 0.6–1)
ERYTHROCYTE [DISTWIDTH] IN BLOOD BY AUTOMATED COUNT: 12.8 % (ref 11.9–14.6)
GLOBULIN SER CALC-MCNC: 3.7 G/DL (ref 2.3–3.5)
GLUCOSE SERPL-MCNC: 91 MG/DL (ref 65–100)
HCT VFR BLD AUTO: 34.1 % (ref 35.8–46.3)
HGB BLD-MCNC: 11.5 G/DL (ref 11.7–15.4)
LDH SERPL L TO P-CCNC: 151 U/L (ref 100–190)
MCH RBC QN AUTO: 29.7 PG (ref 26.1–32.9)
MCHC RBC AUTO-ENTMCNC: 33.7 G/DL (ref 31.4–35)
MCV RBC AUTO: 88.1 FL (ref 79.6–97.8)
PLATELET # BLD AUTO: 184 K/UL (ref 150–450)
PMV BLD AUTO: 10.4 FL (ref 10.8–14.1)
POTASSIUM SERPL-SCNC: 3.6 MMOL/L (ref 3.5–5.1)
PROT SERPL-MCNC: 6.2 G/DL (ref 6.3–8.2)
RBC # BLD AUTO: 3.87 M/UL (ref 4.05–5.25)
SODIUM SERPL-SCNC: 139 MMOL/L (ref 136–145)
URATE SERPL-MCNC: 3.9 MG/DL (ref 2.6–6)
WBC # BLD AUTO: 11.7 K/UL (ref 4.3–11.1)

## 2018-01-11 PROCEDURE — 83615 LACTATE (LD) (LDH) ENZYME: CPT | Performed by: OBSTETRICS & GYNECOLOGY

## 2018-01-11 PROCEDURE — 65270000029 HC RM PRIVATE

## 2018-01-11 PROCEDURE — 36415 COLL VENOUS BLD VENIPUNCTURE: CPT | Performed by: OBSTETRICS & GYNECOLOGY

## 2018-01-11 PROCEDURE — 99254 IP/OBS CNSLTJ NEW/EST MOD 60: CPT | Performed by: OBSTETRICS & GYNECOLOGY

## 2018-01-11 PROCEDURE — 80053 COMPREHEN METABOLIC PANEL: CPT | Performed by: OBSTETRICS & GYNECOLOGY

## 2018-01-11 PROCEDURE — 85027 COMPLETE CBC AUTOMATED: CPT | Performed by: OBSTETRICS & GYNECOLOGY

## 2018-01-11 PROCEDURE — 84550 ASSAY OF BLOOD/URIC ACID: CPT | Performed by: OBSTETRICS & GYNECOLOGY

## 2018-01-11 NOTE — PROGRESS NOTES
Spoke with Dr Sangita Yu regarding patient arrival to unit sent from office for [address] work up and MFM consult.     MD plans to see patient this afternoon  MD will review BP and labs in computer  Primary RN Roselyn Rosenthal made aware of this

## 2018-01-11 NOTE — IP AVS SNAPSHOT
[address] 
[phone] Patient: Lucita Gifford MRN: [id number] BSN:[id number] About your hospitalization You were admitted on:  January 11, 2018 You last received care in the:  [facility] 4 ANTEPARTUM You were discharged on:  January 11, 2018 Why you were hospitalized Your primary diagnosis was:  Not on File Follow-up Information None Your Scheduled Appointments Thursday January 18, 2018 12:30 PM EST Ultrasound plus physician visit with Beatriz Villareal  ([facility]) [address] [id number] [phone] Thursday January 18, 2018  2:10 PM EST Ultrasound plus physician visit with Kt Oliveira MD  
[facility] ([facility]) [address] [id number] [phone] Discharge Orders None A check franklin indicates which time of day the medication should be taken. My Medications ASK your doctor about these medications Instructions Each Dose to Equal  
 Morning Noon Evening Bedtime  
 butalbital-acetaminophen-caffeine -40 mg per tablet Commonly known as:  Pecola Aloe Your last dose was: Your next dose is: Take 1-2 Tabs by mouth every six (6) hours as needed for Pain. Max Daily Amount: 8 Tabs. 1-2 Tab HYDROcodone-acetaminophen 5-325 mg per tablet Commonly known as:  Akin Silvius Your last dose was: Your next dose is: Take 1 Tab by mouth every four (4) hours as needed for Pain (headache). Max Daily Amount: 6 Tabs. Indications: Pain 1 Tab MIRALAX 17 gram packet Generic drug:  polyethylene glycol Your last dose was: Your next dose is: Take 17 g by mouth daily. 17 g PNV 67-iron ps-folate no. 1-dha 29 mg iron- 1 mg-200 mg Cap Commonly known as:  Sabina Rust Your last dose was: Your next dose is: Take 1 Cap by mouth daily. 1 Cap TYLENOL 325 mg tablet Generic drug:  acetaminophen Your last dose was: Your next dose is: Take  by mouth every four (4) hours as needed for Pain. VITAMIN D3 2,000 unit Tab Generic drug:  cholecalciferol (vitamin D3) Your last dose was: Your next dose is: Take  by mouth. Discharge Instructions HYPERTENSIVE DISORDERS OF PREGNANCY DISCHARGE INSTRUCTIONS Name: Walter Dos Santos YOB: 1990 Primary Diagnosis: Active Problems: * No active hospital problems. * Introduction: 
You have visited the hospital because your physician thinks your blood pressure is increased or increasing. This condition during pregnancy is called gestational hypertension. If you also have an elevated level of protein in your urine, this condition is called preeclampsia. Some of the characteristics you may have seen or felt include edema (usually swelling of face, feet, ankles or hands), and increased weight gain, headaches, spots (floaters) before your eyes or excessive epigastric pain (much more intense than indigestion). It is because of these symptoms and the effect on you and your baby/babies that you have come to the hospital. These guidelines are for your information at home to help you decide whether to call your physician an possibly the need for hospitalization. General: MAKE FOLLOW UP APPT WITH [facility] Diet/Diet Restrictions:   
 
Drink 8-10 glasses of water each day. Avoid beverages with caffeine. Physical Activity / Restrictions / Safety:  
 
* Activity at home is based on how you may be feeling or at the direction of your physician. Modified bedrest: Discharge Instructions/ Special Treatment/ Home Care Needs:  
 
Call your provider if: 
? You notice an increase in swelling or excessive epigastric pain. ? You have a constant headache not relieved by Tylenol. ? You have dizziness while carrying out activities of daily living. ? You see spots (floaters) before your eyes. ? You start having regular painful contractions every 5 minutes or less for 1 hour. Time your contractions from the beginning of one to the beginning of the next one.  
? Your baby is not moving as much as usual-at least 4 fetal movements in 1 hour after drinking and resting on your side for 1 hour. ? You have a gush of fluid or blood from your vagina (it is normal to have spotting after vaginal exam or intercourse). ? Other: 
 
 
 labor instructions:  
? Uterine cramping (menstrual-like cramps, intermittent or constant ? Uterine contractions every 10-15 minutes or more frequently ? Low abdominal pressure (pelvic pressure) ? Dull low backache (intermittent or constant) ? Increase or change in vaginal discharge ? Feeling that the baby is \"pushing down\" ? Abdominal cramping with or without diarrhea If any of these symptoms are experienced, stop what you are doing, lie down on your side, drink two to three glasses of water and wait one hour. If the symptoms persist or get worse, call your provider. Pain Management:  
 
 
 
 
 
Signed By: Maria Fernanda Grady RN                                                                                                   Date: 2018 Time: 3:04 PM 
 
Discharge Checklist-NURSING TO COMPLETE:  
 
Date and Time of Discharge: Date: 2018 Time: 3:04 PM 
 
Return of:  
Dental Appliance: Dental Appliances: None Vision: Visual Aid: None Hearing Aid:   
Jewelry: Josery: Sherly Montague Clothing: Clothing: Other (comment) (CLOTHES WORN IN) Other Valuables: Other Valuables: Other (comment), Cell Phone, "[employer] Seed Valuables sent to safe:   
 
Prescription Given: no 
Medication Instruction Sheet(s), including side effects, provided: no 
 
Accompanied By: Family Mode of Transportation: 
 
Discharge Disposition: Home I have had the opportunity to make my options or choices for discharge. I have received and understand these instructions. Week 37 of Your Pregnancy: Care Instructions Your Care Instructions You are near the end of your pregnancy-and you're probably pretty uncomfortable. It may be harder to walk around. Lying down probably isn't comfortable either. You may have trouble getting to sleep or staying asleep. Most women deliver their babies between 40 and 41 weeks. This is a good time to think about packing a bag for the hospital with items you'll need. Then you'll be ready when labor starts. Follow-up care is a key part of your treatment and safety. Be sure to make and go to all appointments, and call your doctor if you are having problems. It's also a good idea to know your test results and keep a list of the medicines you take. How can you care for yourself at home? Learn about breastfeeding · Breastfeeding is best for your baby and good for you. · Breast milk has antibodies to help your baby fight infections. · Mothers who breastfeed often lose weight faster, because making milk burns calories. · Learning the best ways to hold your baby will make breastfeeding easier. · Let your partner bathe and diaper the baby to keep your partner from feeling left out. Snuggle together when you breastfeed. · You may want to learn how to use a breast pump and store your milk. · If you choose to bottle feed, make the feeding feel like breastfeeding so you can bond with your baby. Always hold your baby and the bottle. Do not prop bottles or let your baby fall asleep with a bottle. Learn about crying · It is common for babies to cry for 1 to 3 hours a day. Some cry more, some cry less. · Babies don't cry to make you upset or because you are a bad parent. · Crying is how your baby communicates. Your baby may be hungry; have gas; need a diaper change; or feel cold, warm, tired, lonely, or tense. Sometimes babies cry for unknown reasons. · If you respond to your baby's needs, he or she will learn to trust you. · Try to stay calm when your baby cries. Your baby may get more upset if he or she senses that you are upset. Know how to care for your  · Your baby's umbilical cord stump will drop off on its own, usually between 1 and 2 weeks. To care for your baby's umbilical cord area: ¨ Clean the area at the bottom of the cord 2 or 3 times a day. ¨ Pay special attention to the area where the cord attaches to the skin. ¨ Keep the diaper folded below the cord. ¨ Use a damp washcloth or cotton ball to sponge bathe your baby until the stump has come off. · Your baby's first dark stool is called meconium. After the meconium is passed, your baby will develop his or her own bowel pattern. ¨ Some babies, especially  babies, have several bowel movements a day. Others have one or two a day, or one every 2 to 3 days. ¨  babies often have loose, yellow stools. Formula-fed babies have more formed stools. ¨ If your baby's stools look like little pellets, he or she is constipated. After 2 days of constipation, call your baby's doctor. · If your baby will be circumcised, you can care for him at home. ¨ Gently rinse his penis with warm water after every diaper change. Do not try to remove the film that forms on the penis. This film will go away on its own. Pat dry. ¨ Put petroleum ointment, such as Vaseline, on the area of the diaper that will touch your baby's penis. This will keep the diaper from sticking to your baby. ¨ Ask the doctor about giving your baby acetaminophen (Tylenol) for pain. Where can you learn more? Go to http://ravindra.info/. Enter 68 21 97 in the search box to learn more about \"Week 37 of Your Pregnancy: Care Instructions. \" Current as of: March 16, 2017 Content Version: 11.4 © 8440-4425 [employer]. Care instructions adapted under license by [employer] (which disclaims liability or warranty for this information). If you have questions about a medical condition or this instruction, always ask your healthcare professional. [place] [address] any warranty or liability for your use of this information. Introducing [facility]! Dear Kerry Vuong: 
Thank you for requesting a Talkable account. Our records indicate that you already have an active Talkable account. You can access your account anytime at [email or web address]. [email or web address] Did you know that you can access your hospital and ER discharge instructions at any time in Talkable? You can also review all of your test results from your hospital stay or ER visit. Additional Information If you have questions, please visit the Frequently Asked Questions section of the Talkable website at [email or web address] Remember, Talkable is NOT to be used for urgent needs. For medical emergencies, dial 911. Now available from your iPhone and Android! Providers Seen During Your Hospitalization Provider Specialty Primary office phone Lilli Knight MD Obstetrics & Gynecology [phone] Your Primary Care Physician (PCP) Primary Care Physician Office Phone Office Fax OTHER, PHYS ** None ** ** None ** You are allergic to the following Allergen Reactions Pcn (Penicillins) Hives Recent Documentation Height Weight Breastfeeding? BMI OB Status Smoking Status 1.727 m 100.2 kg Yes 33.6 kg/m2 Pregnant Never Smoker Emergency Contacts Name Discharge Info Relation Home Work Mobile Luis Felipe Blankenship  Spouse [3] [phone] Patient Belongings The following personal items are in your possession at time of discharge: 
  Dental Appliances: None  Visual Aid: None      Home Medications: None   Jewelry: Necklace  Clothing: Other (comment) (CLOTHES WORN IN)    Other Valuables: Other (comment), Cell Phone, Junious [place] Please provide this summary of care documentation to your next provider. Signatures-by signing, you are acknowledging that this After Visit Summary has been reviewed with you and you have received a copy. Patient Signature:  ____________________________________________________________ Date:  ____________________________________________________________  
  
[facility] Provider Signature:  ____________________________________________________________ Date:  ____________________________________________________________

## 2018-01-11 NOTE — DISCHARGE INSTRUCTIONS
HYPERTENSIVE DISORDERS OF PREGNANCY DISCHARGE INSTRUCTIONS    Name: Ramonita Li  YOB: 1990  Primary Diagnosis: Active Problems:    * No active hospital problems. *      Introduction:  You have visited the hospital because your physician thinks your blood pressure is increased or increasing. This condition during pregnancy is called gestational hypertension. If you also have an elevated level of protein in your urine, this condition is called preeclampsia. Some of the characteristics you may have seen or felt include edema (usually swelling of face, feet, ankles or hands), and increased weight gain, headaches, spots (floaters) before your eyes or excessive epigastric pain (much more intense than indigestion). It is because of these symptoms and the effect on you and your baby/babies that you have come to the hospital. These guidelines are for your information at home to help you decide whether to call your physician an possibly the need for hospitalization. General:     MAKE FOLLOW UP APPT WITH [facility] FOR MONDAY    Diet/Diet Restrictions:      Drink 8-10 glasses of water each day. Avoid beverages with caffeine. Physical Activity / Restrictions / Safety:     * Activity at home is based on how you may be feeling or at the direction of your physician. Modified bedrest:         Discharge Instructions/ Special Treatment/ Home Care Needs:     Call your provider if:   You notice an increase in swelling or excessive epigastric pain.  You have a constant headache not relieved by Tylenol.  You have dizziness while carrying out activities of daily living.  You see spots (floaters) before your eyes.  You start having regular painful contractions every 5 minutes or less for 1 hour. Time your contractions from the beginning of one to the beginning of the next one.      Your baby is not moving as much as usual-at least 4 fetal movements in 1 hour after drinking and resting on your side for 1 hour.    You have a gush of fluid or blood from your vagina (it is normal to have spotting after vaginal exam or intercourse).  Other:       labor instructions:    Uterine cramping (menstrual-like cramps, intermittent or constant   Uterine contractions every 10-15 minutes or more frequently   Low abdominal pressure (pelvic pressure)   Dull low backache (intermittent or constant)   Increase or change in vaginal discharge   Feeling that the baby is \"pushing down\"   Abdominal cramping with or without diarrhea  If any of these symptoms are experienced, stop what you are doing, lie down on your side, drink two to three glasses of water and wait one hour. If the symptoms persist or get worse, call your provider. Pain Management:             Signed By: Sukhwinder Hernandez RN                                                                                                   Date: 2018 Time: 3:04 PM    Discharge Checklist-NURSING TO COMPLETE:     Date and Time of Discharge: Date: 2018 Time: 3:04 PM    Return of:   Dental Appliance: Dental Appliances: None  Vision: Visual Aid: None  Hearing Aid:    Jewelry: Jewelry: Necklace  Clothing: Clothing: Other (comment) (CLOTHES WORN IN)  Other Valuables: Other Valuables: Other (comment), Cell Phone, [phone] [place] [address] sent to safe:      Prescription Given: no  Medication Instruction Sheet(s), including side effects, provided: no    Accompanied By: Family    Mode of Transportation:    Discharge Disposition: Home    I have had the opportunity to make my options or choices for discharge. I have received and understand these instructions. Week 37 of Your Pregnancy: Care Instructions  Your Care Instructions    You are near the end of your pregnancy-and you're probably pretty uncomfortable. It may be harder to walk around. Lying down probably isn't comfortable either. You may have trouble getting to sleep or staying asleep.   Most women deliver their babies between 40 and 41 weeks. This is a good time to think about packing a bag for the hospital with items you'll need. Then you'll be ready when labor starts. Follow-up care is a key part of your treatment and safety. Be sure to make and go to all appointments, and call your doctor if you are having problems. It's also a good idea to know your test results and keep a list of the medicines you take. How can you care for yourself at home? Learn about breastfeeding  · Breastfeeding is best for your baby and good for you. · Breast milk has antibodies to help your baby fight infections. · Mothers who breastfeed often lose weight faster, because making milk burns calories. · Learning the best ways to hold your baby will make breastfeeding easier. · Let your partner bathe and diaper the baby to keep your partner from feeling left out. Snuggle together when you breastfeed. · You may want to learn how to use a breast pump and store your milk. · If you choose to bottle feed, make the feeding feel like breastfeeding so you can bond with your baby. Always hold your baby and the bottle. Do not prop bottles or let your baby fall asleep with a bottle. Learn about crying  · It is common for babies to cry for 1 to 3 hours a day. Some cry more, some cry less. · Babies don't cry to make you upset or because you are a bad parent. · Crying is how your baby communicates. Your baby may be hungry; have gas; need a diaper change; or feel cold, warm, tired, lonely, or tense. Sometimes babies cry for unknown reasons. · If you respond to your baby's needs, he or she will learn to trust you. · Try to stay calm when your baby cries. Your baby may get more upset if he or she senses that you are upset. Know how to care for your   · Your baby's umbilical cord stump will drop off on its own, usually between 1 and 2 weeks.  To care for your baby's umbilical cord area:  ¨ Clean the area at the bottom of the cord 2 or 3 times a day. ¨ Pay special attention to the area where the cord attaches to the skin. ¨ Keep the diaper folded below the cord. ¨ Use a damp washcloth or cotton ball to sponge bathe your baby until the stump has come off. · Your baby's first dark stool is called meconium. After the meconium is passed, your baby will develop his or her own bowel pattern. ¨ Some babies, especially  babies, have several bowel movements a day. Others have one or two a day, or one every 2 to 3 days. ¨  babies often have loose, yellow stools. Formula-fed babies have more formed stools. ¨ If your baby's stools look like little pellets, he or she is constipated. After 2 days of constipation, call your baby's doctor. · If your baby will be circumcised, you can care for him at home. ¨ Gently rinse his penis with warm water after every diaper change. Do not try to remove the film that forms on the penis. This film will go away on its own. Pat dry. ¨ Put petroleum ointment, such as Vaseline, on the area of the diaper that will touch your baby's penis. This will keep the diaper from sticking to your baby. ¨ Ask the doctor about giving your baby acetaminophen (Tylenol) for pain. Where can you learn more? Go to http://edmar-werner.info/. Enter 61 41 94 in the search box to learn more about \"Week 37 of Your Pregnancy: Care Instructions. \"  Current as of: March 16, 2017  Content Version: 11.4  © 7436-4073 [employer]. Care instructions adapted under license by [employer] (which disclaims liability or warranty for this information). If you have questions about a medical condition or this instruction, always ask your healthcare professional. [place] [address] any warranty or liability for your use of this information.

## 2018-01-11 NOTE — PROGRESS NOTES
Patient here for Pre-E work up sent from office. Orders received per Dr Supa Bradley and placed in computer   Report to SACHI Rodriguez RN  Notified Dr Marquez Martinez regarding MFM consult

## 2018-01-11 NOTE — PROGRESS NOTES
PT ARRIVES TO HOSPITAL AFTER BEING SEEN IN OFFICE. PT SEEING STARS IN HER VISION AND  WANTED TO CHECK PT'S LABS.

## 2018-01-11 NOTE — IP AVS SNAPSHOT
Summary of Care Report The Summary of Care report has been created to help improve care coordination. Users with access to [employer] or [address] (Web-based application) may access additional patient information including the Discharge Summary. If you are not currently a [address] user and need more information, please call the number listed below in the [place] [address] section and ask to be connected with Medical Records. Facility Information Name Address Phone [address] [place] [address] [address] [place] [phone] Patient Information Patient Name Sex NOY Payne ([id number]) Female 1990 Discharge Information Admitting Provider Service Area Unit Salvador Briggs MD / [facility] 4 Antepartum / [phone] Discharge Provider Discharge Date/Time Discharge Disposition Destination (none) (none) (none) (none) Patient Language Language ENGLISH [13] Hospital Problems as of 2018  Reviewed: 2017 10:21 AM by Patrick Juan MD  
 None Non-Hospital Problems as of 2018  Reviewed: 2017 10:21 AM by Patrick Juan MD  
  
  
  
 Class Noted - Resolved Last Modified Active Problems First pregnancy  2017 - Present 2017 by Patrick Juan MD  
  Entered by Salvador Briggs MD  
  Overview Addendum 2017  9:09 AM by Salvador Briggs MD  
   1)hx anxiety/depression. Never found a med that worked well 2)hx anemia. Possibly due to heavy menses 3)prenatal labs nl 
4)breast reduction 5)started baby asa and D   Stop asa at [facility] Supervision of high risk pregnancy in second trimester  2017 - Present 2017 by Patrick Juan MD  
  Entered by Luis Plaza Overview Addendum 2017 10:14 AM by Luis Plaza 9/19/2017 at [facility]:  Pt for consult: positive EIF on ultrasound in OB office. Suspected fetal anomaly, antepartum  9/19/2017 - Present 12/8/2017 by Jordy Ding MD  
  Entered by Khloe Grayson Overview Addendum 12/8/2017  9:04 AM by Jordy Ding MD  
   9/19/2017 at [facility]: Sent for 2 EIFs, AFP Tetra Screen Negative on 8/29/2017. AC 84%, UA Dopplers WNL. Normal anatomy and normal heart except EIF in both vents. CL  4.2. Fetal echo done due to echogenic papillary muscle noted on ultrasound that changes the risk of Down's Syndrome from 1/990 to 1/500. Genetic counseling performed, Declines genetic testing. Questionable EDC, please review, EDC based on fetal measurements ahead. I reviewed. We have good early dating. I don't see that anything needs to be changed. · No MFM f/u recommended. Headache in pregnancy, antepartum, third trimester  12/28/2017 - Present 1/8/2018 by Erick Latham MD  
  Entered by Lior Montague MD  
  Proteinuria affecting pregnancy in third trimester  1/6/2018 - Present 1/8/2018 by Erick Latham MD  
  Entered by Jordy Ding MD  
  Overview Signed 1/6/2018 11:11 AM by Jordy Ding MD  
   ~600mg Polyhydramnios in third trimester  1/11/2018 - Present 1/11/2018 by Jordy Ding MD  
  Entered by Jordy Ding MD  
  Overview Signed 1/11/2018 10:22 AM by Jordy Ding MD  
   1st noted 37 wks You are allergic to the following Allergen Reactions Pcn (Penicillins) Hives Current Discharge Medication List  
  
ASK your doctor about these medications Dose & Instructions Dispensing Information Comments  
 butalbital-acetaminophen-caffeine -40 mg per tablet Commonly known as:  Marie Root Dose:  1-2 Tab Take 1-2 Tabs by mouth every six (6) hours as needed for Pain. Max Daily Amount: 8 Tabs. Quantity:  30 Tab Refills:  2 HYDROcodone-acetaminophen 5-325 mg per tablet Commonly known as:  Verna Lazaro Dose:  1 Tab Take 1 Tab by mouth every four (4) hours as needed for Pain (headache). Max Daily Amount: 6 Tabs. Indications: Pain Quantity:  20 Tab Refills:  0 MIRALAX 17 gram packet Generic drug:  polyethylene glycol Dose:  17 g Take 17 g by mouth daily. Refills:  0  
   
 PNV 67-iron ps-folate no. 1-dha 29 mg iron- 1 mg-200 mg Cap Commonly known as:  Darene Israel Dose:  1 Cap Take 1 Cap by mouth daily. Quantity:  30 Cap Refills:  11 TYLENOL 325 mg tablet Generic drug:  acetaminophen Take  by mouth every four (4) hours as needed for Pain. Refills:  0  
   
 VITAMIN D3 2,000 unit Tab Generic drug:  cholecalciferol (vitamin D3) Take  by mouth. Refills:  0 Follow-up Information None Discharge Instructions HYPERTENSIVE DISORDERS OF PREGNANCY DISCHARGE INSTRUCTIONS Name: Marvin Ca YOB: 1990 Primary Diagnosis: Active Problems: * No active hospital problems. * Introduction: 
You have visited the hospital because your physician thinks your blood pressure is increased or increasing. This condition during pregnancy is called gestational hypertension. If you also have an elevated level of protein in your urine, this condition is called preeclampsia. Some of the characteristics you may have seen or felt include edema (usually swelling of face, feet, ankles or hands), and increased weight gain, headaches, spots (floaters) before your eyes or excessive epigastric pain (much more intense than indigestion). It is because of these symptoms and the effect on you and your baby/babies that you have come to the hospital. These guidelines are for your information at home to help you decide whether to call your physician an possibly the need for hospitalization. General: MAKE FOLLOW UP APPT WITH Manuel Jean-Baptiste Diet/Diet Restrictions:   
 
Drink 8-10 glasses of water each day. Avoid beverages with caffeine. Physical Activity / Restrictions / Safety:  
 
* Activity at home is based on how you may be feeling or at the direction of your physician. Modified bedrest:   
    
Discharge Instructions/ Special Treatment/ Home Care Needs:  
 
Call your provider if: 
? You notice an increase in swelling or excessive epigastric pain. ? You have a constant headache not relieved by Tylenol. ? You have dizziness while carrying out activities of daily living. ? You see spots (floaters) before your eyes. ? You start having regular painful contractions every 5 minutes or less for 1 hour. Time your contractions from the beginning of one to the beginning of the next one.  
? Your baby is not moving as much as usual-at least 4 fetal movements in 1 hour after drinking and resting on your side for 1 hour. ? You have a gush of fluid or blood from your vagina (it is normal to have spotting after vaginal exam or intercourse). ? Other: 
 
 
 labor instructions:  
? Uterine cramping (menstrual-like cramps, intermittent or constant ? Uterine contractions every 10-15 minutes or more frequently ? Low abdominal pressure (pelvic pressure) ? Dull low backache (intermittent or constant) ? Increase or change in vaginal discharge ? Feeling that the baby is \"pushing down\" ? Abdominal cramping with or without diarrhea If any of these symptoms are experienced, stop what you are doing, lie down on your side, drink two to three glasses of water and wait one hour. If the symptoms persist or get worse, call your provider. Pain Management:  
 
 
 
 
 
Signed By: Kiel Parrish RN                                                                                                   Date: 2018 Time: 3:04 PM 
 
Discharge Checklist-NURSING TO COMPLETE:  
 
Date and Time of Discharge: Date: 2018 Time: 3:04 PM 
 
Return of: Dental Appliance: Dental Appliances: None Vision: Visual Aid: None Hearing Aid:   
Jewelry: Jewelry: [place] Clothing: Clothing: Other (comment) (CLOTHES WORN IN) Other Valuables: Other Valuables: Other (comment), Cell Phone, Gustavo Altamirano Valuables sent to safe:   
 
Prescription Given: no 
Medication Instruction Sheet(s), including side effects, provided: no 
 
Accompanied By: Family Mode of Transportation: 
 
Discharge Disposition: Home I have had the opportunity to make my options or choices for discharge. I have received and understand these instructions. Week 37 of Your Pregnancy: Care Instructions Your Care Instructions You are near the end of your pregnancy-and you're probably pretty uncomfortable. It may be harder to walk around. Lying down probably isn't comfortable either. You may have trouble getting to sleep or staying asleep. Most women deliver their babies between 40 and 41 weeks. This is a good time to think about packing a bag for the hospital with items you'll need. Then you'll be ready when labor starts. Follow-up care is a key part of your treatment and safety. Be sure to make and go to all appointments, and call your doctor if you are having problems. It's also a good idea to know your test results and keep a list of the medicines you take. How can you care for yourself at home? Learn about breastfeeding · Breastfeeding is best for your baby and good for you. · Breast milk has antibodies to help your baby fight infections. · Mothers who breastfeed often lose weight faster, because making milk burns calories. · Learning the best ways to hold your baby will make breastfeeding easier. · Let your partner bathe and diaper the baby to keep your partner from feeling left out. Snuggle together when you breastfeed. · You may want to learn how to use a breast pump and store your milk.  
· If you choose to bottle feed, make the feeding feel like breastfeeding so you can bond with your baby. Always hold your baby and the bottle. Do not prop bottles or let your baby fall asleep with a bottle. Learn about crying · It is common for babies to cry for 1 to 3 hours a day. Some cry more, some cry less. · Babies don't cry to make you upset or because you are a bad parent. · Crying is how your baby communicates. Your baby may be hungry; have gas; need a diaper change; or feel cold, warm, tired, lonely, or tense. Sometimes babies cry for unknown reasons. · If you respond to your baby's needs, he or she will learn to trust you. · Try to stay calm when your baby cries. Your baby may get more upset if he or she senses that you are upset. Know how to care for your  · Your baby's umbilical cord stump will drop off on its own, usually between 1 and 2 weeks. To care for your baby's umbilical cord area: ¨ Clean the area at the bottom of the cord 2 or 3 times a day. ¨ Pay special attention to the area where the cord attaches to the skin. ¨ Keep the diaper folded below the cord. ¨ Use a damp washcloth or cotton ball to sponge bathe your baby until the stump has come off. · Your baby's first dark stool is called meconium. After the meconium is passed, your baby will develop his or her own bowel pattern. ¨ Some babies, especially  babies, have several bowel movements a day. Others have one or two a day, or one every 2 to 3 days. ¨  babies often have loose, yellow stools. Formula-fed babies have more formed stools. ¨ If your baby's stools look like little pellets, he or she is constipated. After 2 days of constipation, call your baby's doctor. · If your baby will be circumcised, you can care for him at home. ¨ Gently rinse his penis with warm water after every diaper change. Do not try to remove the film that forms on the penis. This film will go away on its own. Pat dry.  
¨ Put petroleum ointment, such as Vaseline, on the area of the diaper that will touch your baby's penis. This will keep the diaper from sticking to your baby. ¨ Ask the doctor about giving your baby acetaminophen (Tylenol) for pain. Where can you learn more? Go to http://edmar-werner.info/. Enter 99 99 24 in the search box to learn more about \"Week 37 of Your Pregnancy: Care Instructions. \" Current as of: March 16, 2017 Content Version: 11.4 © 9882-8061 [employer]. Care instructions adapted under license by [employer] (which disclaims liability or warranty for this information). If you have questions about a medical condition or this instruction, always ask your healthcare professional. [place] [address] any warranty or liability for your use of this information. Chart Review Routing History No Routing History on File

## 2018-01-11 NOTE — H&P
History & Physical    Name: Raquel Newby MRN: [id number]  SSN: [id number]    YOB: 1990  Age: 33 y.o. Sex: female      Subjective:     Reason for Admission:  Pregnancy and proteinuria, HA, visual disturbances    History of Present Illness: Ms. Frederico Severe is a 33 y.o.  female with an estimated gestational age of 42w4d with Estimated Date of Delivery: 18. Patient complains of seeing spots in visual periphery for past 36hrs. Has known proteinuria with 600mg/24hrs a couple wks ago. Intermittent HA for past 2 wks. Also edema, and on some visits inappropriate wt gain. This morning no problem with inappropriate wt gain. Due to proteinuria, weekly bpp was started. Poly noted this morning for the 1st time. OB History    Para Term  AB Living   1 0 0 0 0 0   SAB TAB Ectopic Molar Multiple Live Births   0 0 0 0 0 0      # Outcome Date GA Lbr Javi/2nd Weight Sex Delivery Anes PTL Lv   1 Current                 Past Medical History:   Diagnosis Date    Anemia     before pregnancy     Anxiety attack     Calculus of kidney     Depression     History of - none now    History of chicken pox      Past Surgical History:   Procedure Laterality Date    BREAST SURGERY PROCEDURE UNLISTED      HX BREAST REDUCTION       Social History     Occupational History    Not on file. Social History Main Topics    Smoking status: Never Smoker    Smokeless tobacco: Never Used    Alcohol use No    Drug use: No    Sexual activity: Yes     Partners: Male     Birth control/ protection: None      Family History   Problem Relation Age of Onset    Adopted: Yes       Allergies   Allergen Reactions    Pcn [Penicillins] Hives     Prior to Admission medications    Medication Sig Start Date End Date Taking? Authorizing Provider   HYDROcodone-acetaminophen (NORCO) 5-325 mg per tablet Take 1 Tab by mouth every four (4) hours as needed for Pain (headache). Max Daily Amount: 6 Tabs.  Indications: Pain 17  Yes Navdeep Zee MD   acetaminophen (TYLENOL) 325 mg tablet Take  by mouth every four (4) hours as needed for Pain. Yes Historical Provider   polyethylene glycol (MIRALAX) 17 gram packet Take 17 g by mouth daily. Yes Historical Provider   cholecalciferol, vitamin D3, (VITAMIN D3) 2,000 unit tab Take  by mouth. Yes Historical Provider   butalbital-acetaminophen-caffeine (FIORICET, ESGIC) -40 mg per tablet Take 1-2 Tabs by mouth every six (6) hours as needed for Pain. Max Daily Amount: 8 Tabs. 17  Yes Dionne Streeter MD   PNV 67-iron ps-folate no. 1-dha (VITAFOL ULTRA) 29 mg iron- 1 mg-200 mg cap Take 1 Cap by mouth daily. 17  Yes Dionne Streeter MD        Review of Systems:  Pertinent items are noted in the History of Present Illness. Objective:     Vitals:    Vitals:    18 1128 18 1135 18 1145 18 1155   BP:  130/75 129/72 124/75   Pulse:  94 89 89   Resp:  20 20 20   Temp:       Weight: 221 lb (100.2 kg)      Height: 5' 8\" (1.727 m)         Temp (24hrs), Av.2 °F (36.8 °C), Min:98.2 °F (36.8 °C), Max:98.2 °F (36.8 °C)    BP  Min: 124/75  Max: 136/86     Physical Exam:  Patient without distress. Heart: Regular rate and rhythm  Lung: clear to auscultation throughout lung fields, no wheezes, no rales, no rhonchi and normal respiratory effort  Abdomen: soft, nontender  Lower Extremities:  - Edema 2+   - Patellar Reflexes: 1+ bilaterally   - Clonus: absent     Membranes:  Intact  Vtx. bpp 8/8. catrachito 23.58       Lab/Data Review:  No results found for this or any previous visit (from the past 24 hour(s)). Assessment and Plan:     Primigravida with concerning symptoms in setting of proteinura. Admit for labs and bp monitoring. Also ck qid bs's given new poly. Ordered diabetic (low carb) diet.     Signed By:  Dionne Streeter MD     2018

## 2018-01-11 NOTE — CONSULTS
Maternal Fetal Medicine Consult Note    Requesting BEATA Bradley    Chief Complaint:  Pregnancy and headache  and vision changes. History of Present Illness-   Xochitl Miller is a 33 y.o.   with an estimated gestational age of 42w4d with Estimated Date of Delivery: 18. Pregnancy has been complicated by proteinuria of 600mg/day. This am, peripheral visual sparkles. Has had mild HA for 2 weeks that waxes and wanes. Patient rarely takes medication for it. BP remains stable. Patient denies RUQ pain, CP/SOA, other concerns. Fetus has been active without any recent decrease in movement activity. No signs of labor. Good FM. Since admission, BP stable, feeling better, and normal labs. Patient continues to work full time at [employer]. Diagnosed today with mild polyhydramnios with DVP >8cm. OB History    Para Term  AB Living   1 0 0 0 0 0   SAB TAB Ectopic Molar Multiple Live Births   0 0 0 0 0 0      # Outcome Date GA Lbr Javi/2nd Weight Sex Delivery Anes PTL Lv   1 Current                  Past Surgical History:   Procedure Laterality Date    BREAST SURGERY PROCEDURE UNLISTED      HX BREAST REDUCTION       Past Medical History:   Diagnosis Date    Anemia     before pregnancy     Anxiety attack     Calculus of kidney     Depression     History of - none now    History of chicken pox        Family History   Problem Relation Age of Onset    Adopted: Yes     Allergies   Allergen Reactions    Pcn [Penicillins] Hives     No current facility-administered medications for this encounter. Social History     Social History    Marital status:      Spouse name: N/A    Number of children: N/A    Years of education: N/A     Occupational History    Not on file.      Social History Main Topics    Smoking status: Never Smoker    Smokeless tobacco: Never Used    Alcohol use No    Drug use: No    Sexual activity: Yes     Partners: Male     Birth control/ protection: None     Other Topics Concern    Not on file     Social History Narrative     Review of Systems  A comprehensive review of systems was negative except for that written in the HPI. Vitals:  Patient Vitals for the past 24 hrs:   BP   18 1304 133/79   18 1255 129/77   18 1245 134/79   18 1234 127/67   18 1224 122/73   18 1214 134/79   18 1205 125/76   18 1155 124/75   18 1145 129/72   18 1135 130/75   18 1122 130/73     Temp (24hrs), Av.2 °F (36.8 °C), Min:98.2 °F (36.8 °C), Max:98.2 °F (36.8 °C)      I&O:                    Exam:  Patient without distress.                Abdomen: soft, non-tender               Fundus: soft and non tender               Fundal Height: 38 cm               Right Upper Quadrant: non-tender               Lower Extremity Edema: 1+               Patellar Reflexes: 1+ bilaterally               Clonus: absent    Cervical Exam:     deferred                                Uterine Activity: Frequency (min): 2-4                                   Membranes: Membrane Status: Intact                              Fetal Heart Rate: Mode: ExternalFetal Heart Rate: 130          Labs:   CBC:  Recent Labs      18   1142  18   1653  17   1609  17   1538  17   1608 17   WBC  11.7*  11.0*  8.6  10.2  8.8   --    HGB  11.5*  11.7  10.8*  11.8  13.1   --    HCT  34.1*  35.6  31.9*  34.8  39.4   --    PLT  184  175  171  191  208   --    HGBEXT   --    --    --    --    --   13.1   HCTEXT   --    --    --    --    --   39.4   PLTEXT   --    --    --    --    --   208       CMP: Recent Labs      18   1142  18   1653  17   1609   NA  139  139  141   K  3.6  4.0  3.8   CL  106  106  107   CO2  17*  18  21   AGAP  16   --   13   GLU  91  73  106*   BUN  7  5*  6   CREA  0.54*  0.54*  0.68   GFRAA  >60  151  >60   GFRNA  >60  131  >60   CA  8.5  8.4*  8.4   ALB  2.5*  3.4*  2.3* TP  6.2*  5.9*  5.8*   GLOB  3.7*   --   3.5   AGRAT  0.7*  1.4  0.7*   SGOT  16  13  17   ALT  18  14  20       Recent Labs      01/11/18   1142  01/04/18   1653  12/30/17   0800  12/28/17   1609   URICA  3.9  3.8   --   3.8   LDH  151  204   --    --    PUQ   --    --   648   --        Recent Glucose Results: Recent Glucose Results: Recent Labs      01/11/18   1142  01/04/18   1653  12/28/17   1609   GLU  91  73  106*       Prenatal Labs:  Lab Results   Component Value Date/Time    Rubella, External Immune 06/20/2017    GrBStrep, External Negative 01/04/2018    HBsAg, External Negative 06/20/2017    HIV, External Negative 06/20/2017    RPR, External Negative 06/20/2017    Gonorrhea, External Negative 08/01/2017    Chlamydia, External Negative 08/01/2017         Assessment and Plan:    Patient Active Problem List    Diagnosis    Polyhydramnios in third trimester     1st noted 37 wks      Proteinuria affecting pregnancy in third trimester     ~600mg      Headache in pregnancy, antepartum, third trimester    Supervision of high risk pregnancy in second trimester     9/19/2017 at [facility]:  Pt for consult: positive EIF on ultrasound in OB office.  Suspected fetal anomaly, antepartum     9/19/2017 at [facility]: Sent for 2 EIFs, AFP Tetra Screen Negative on 8/29/2017. AC 84%, UA Dopplers WNL. Normal anatomy and normal heart except EIF in both vents. CL  4.2. Fetal echo done due to echogenic papillary muscle noted on ultrasound that changes the risk of Down's Syndrome from 1/990 to 1/500. Genetic counseling performed, Declines genetic testing. Questionable EDC, please review, EDC based on fetal measurements ahead. I reviewed. We have good early dating. I don't see that anything needs to be changed. · No MFM f/u recommended.  First pregnancy     1)hx anxiety/depression. Never found a med that worked well    2)hx anemia.  Possibly due to heavy menses  3)prenatal labs nl  4)breast reduction  5)started baby asa and D   Stop asa at 38wks         33 y.o.  at 37w1d with proteinuria and HA with new visual changes. At this time, patient does not appear to have preeclampsia. She, her  and family understand she is at very high risk of it developing prior to her scheduled time of delivery at 39 weeks. No indication for steroids for lung maturity. Patient will have BP monitored this afternoon and if remains stable, may dc home with strict precautions. Patient will do low carb diet. She should start twice weekly testing on Monday with [facility]. Delivery at 39wk, sooner if HTN develops, worsening of HA, other concerning findings or concerns for maternal or fetal status. Signed By:  Aide Archer MD     2018         D/W Dr Jose Osorio. Time: 90   Minutes spent on floor,with greater than 50% of the time examining patient, explaining plan and coordinating care with nurse and requesting primary physician.

## 2018-01-11 NOTE — IP AVS SNAPSHOT
[address] 
[phone] Patient: Elisha Euceda MRN: [id number] [place]:[birth year] A check franklin indicates which time of day the medication should be taken. My Medications ASK your doctor about these medications Instructions Each Dose to Equal  
 Morning Noon Evening Bedtime  
 butalbital-acetaminophen-caffeine -40 mg per tablet Commonly known as:  Tarah Martinez Your last dose was: Your next dose is: Take 1-2 Tabs by mouth every six (6) hours as needed for Pain. Max Daily Amount: 8 Tabs. 1-2 Tab HYDROcodone-acetaminophen 5-325 mg per tablet Commonly known as:  Alie Pont Your last dose was: Your next dose is: Take 1 Tab by mouth every four (4) hours as needed for Pain (headache). Max Daily Amount: 6 Tabs. Indications: Pain 1 Tab MIRALAX 17 gram packet Generic drug:  polyethylene glycol Your last dose was: Your next dose is: Take 17 g by mouth daily. 17 g PNV 67-iron ps-folate no. 1-dha 29 mg iron- 1 mg-200 mg Cap Commonly known as:  Saralee Even Your last dose was: Your next dose is: Take 1 Cap by mouth daily. 1 Cap TYLENOL 325 mg tablet Generic drug:  acetaminophen Your last dose was: Your next dose is: Take  by mouth every four (4) hours as needed for Pain. VITAMIN D3 2,000 unit Tab Generic drug:  cholecalciferol (vitamin D3) Your last dose was: Your next dose is: Take  by mouth.

## 2018-01-22 ENCOUNTER — HOSPITAL ENCOUNTER (INPATIENT)
Age: 28
LOS: 5 days | Discharge: HOME | End: 2018-01-27
Attending: OBSTETRICS & GYNECOLOGY | Admitting: OBSTETRICS & GYNECOLOGY
Payer: COMMERCIAL

## 2018-01-22 DIAGNOSIS — G89.18 POSTOPERATIVE PAIN: Primary | ICD-10-CM

## 2018-01-22 PROBLEM — O14.93 PRE-ECLAMPSIA IN THIRD TRIMESTER: Status: ACTIVE | Noted: 2018-01-22

## 2018-01-22 LAB
ABO + RH BLD: NORMAL
ALBUMIN SERPL-MCNC: 2.7 G/DL (ref 3.5–5)
ALBUMIN/GLOB SERPL: 0.8 {RATIO} (ref 1.2–3.5)
ALP SERPL-CCNC: 192 U/L (ref 50–136)
ALT SERPL-CCNC: 18 U/L (ref 12–65)
ANION GAP SERPL CALC-SCNC: 14 MMOL/L (ref 7–16)
AST SERPL-CCNC: 16 U/L (ref 15–37)
BILIRUB SERPL-MCNC: 0.2 MG/DL (ref 0.2–1.1)
BLOOD GROUP ANTIBODIES SERPL: NORMAL
BUN SERPL-MCNC: 7 MG/DL (ref 6–23)
CALCIUM SERPL-MCNC: 8.8 MG/DL (ref 8.3–10.4)
CHLORIDE SERPL-SCNC: 104 MMOL/L (ref 98–107)
CO2 SERPL-SCNC: 19 MMOL/L (ref 21–32)
CREAT SERPL-MCNC: 0.6 MG/DL (ref 0.6–1)
ERYTHROCYTE [DISTWIDTH] IN BLOOD BY AUTOMATED COUNT: 12.8 % (ref 11.9–14.6)
GLOBULIN SER CALC-MCNC: 3.4 G/DL (ref 2.3–3.5)
GLUCOSE SERPL-MCNC: 75 MG/DL (ref 65–100)
HCT VFR BLD AUTO: 32.8 % (ref 35.8–46.3)
HGB BLD-MCNC: 10.8 G/DL (ref 11.7–15.4)
LDH SERPL L TO P-CCNC: 233 U/L (ref 100–190)
MCH RBC QN AUTO: 29.1 PG (ref 26.1–32.9)
MCHC RBC AUTO-ENTMCNC: 32.9 G/DL (ref 31.4–35)
MCV RBC AUTO: 88.4 FL (ref 79.6–97.8)
PLATELET # BLD AUTO: 180 K/UL (ref 150–450)
PMV BLD AUTO: 11.3 FL (ref 10.8–14.1)
POTASSIUM SERPL-SCNC: 4.1 MMOL/L (ref 3.5–5.1)
PROT SERPL-MCNC: 6.1 G/DL (ref 6.3–8.2)
RBC # BLD AUTO: 3.71 M/UL (ref 4.05–5.25)
SODIUM SERPL-SCNC: 137 MMOL/L (ref 136–145)
SPECIMEN EXP DATE BLD: NORMAL
URATE SERPL-MCNC: 4.8 MG/DL (ref 2.6–6)
WBC # BLD AUTO: 9.3 K/UL (ref 4.3–11.1)

## 2018-01-22 PROCEDURE — 74011250636 HC RX REV CODE- 250/636: Performed by: OBSTETRICS & GYNECOLOGY

## 2018-01-22 PROCEDURE — 80053 COMPREHEN METABOLIC PANEL: CPT | Performed by: OBSTETRICS & GYNECOLOGY

## 2018-01-22 PROCEDURE — 4A1HXCZ MONITORING OF PRODUCTS OF CONCEPTION, CARDIAC RATE, EXTERNAL APPROACH: ICD-10-PCS | Performed by: OBSTETRICS & GYNECOLOGY

## 2018-01-22 PROCEDURE — 36415 COLL VENOUS BLD VENIPUNCTURE: CPT | Performed by: OBSTETRICS & GYNECOLOGY

## 2018-01-22 PROCEDURE — 86900 BLOOD TYPING SEROLOGIC ABO: CPT | Performed by: OBSTETRICS & GYNECOLOGY

## 2018-01-22 PROCEDURE — 74011250637 HC RX REV CODE- 250/637: Performed by: OBSTETRICS & GYNECOLOGY

## 2018-01-22 PROCEDURE — 85027 COMPLETE CBC AUTOMATED: CPT | Performed by: OBSTETRICS & GYNECOLOGY

## 2018-01-22 PROCEDURE — 84550 ASSAY OF BLOOD/URIC ACID: CPT | Performed by: OBSTETRICS & GYNECOLOGY

## 2018-01-22 PROCEDURE — 83615 LACTATE (LD) (LDH) ENZYME: CPT | Performed by: OBSTETRICS & GYNECOLOGY

## 2018-01-22 PROCEDURE — 65270000029 HC RM PRIVATE

## 2018-01-22 RX ORDER — PROMETHAZINE HYDROCHLORIDE 25 MG/1
25 TABLET ORAL
Status: DISCONTINUED | OUTPATIENT
Start: 2018-01-22 | End: 2018-01-25

## 2018-01-22 RX ORDER — MINERAL OIL
120 OIL (ML) ORAL
Status: DISCONTINUED | OUTPATIENT
Start: 2018-01-22 | End: 2018-01-25 | Stop reason: HOSPADM

## 2018-01-22 RX ORDER — BUTALBITAL, ACETAMINOPHEN AND CAFFEINE 50; 325; 40 MG/1; MG/1; MG/1
1 TABLET ORAL
Status: DISCONTINUED | OUTPATIENT
Start: 2018-01-22 | End: 2018-01-25

## 2018-01-22 RX ORDER — ZOLPIDEM TARTRATE 5 MG/1
5 TABLET ORAL
Status: DISCONTINUED | OUTPATIENT
Start: 2018-01-22 | End: 2018-01-25

## 2018-01-22 RX ORDER — HYDROCODONE BITARTRATE AND ACETAMINOPHEN 5; 325 MG/1; MG/1
2 TABLET ORAL
COMMUNITY
End: 2018-01-27

## 2018-01-22 RX ORDER — BUTORPHANOL TARTRATE 1 MG/ML
1 INJECTION INTRAMUSCULAR; INTRAVENOUS
Status: DISCONTINUED | OUTPATIENT
Start: 2018-01-22 | End: 2018-01-25 | Stop reason: HOSPADM

## 2018-01-22 RX ORDER — LIDOCAINE HYDROCHLORIDE 10 MG/ML
1 INJECTION INFILTRATION; PERINEURAL
Status: DISCONTINUED | OUTPATIENT
Start: 2018-01-22 | End: 2018-01-25 | Stop reason: HOSPADM

## 2018-01-22 RX ORDER — LIDOCAINE HYDROCHLORIDE 20 MG/ML
JELLY TOPICAL
Status: DISCONTINUED | OUTPATIENT
Start: 2018-01-22 | End: 2018-01-25 | Stop reason: HOSPADM

## 2018-01-22 RX ADMIN — MISOPROSTOL 25 MCG: 100 TABLET ORAL at 22:32

## 2018-01-22 RX ADMIN — MISOPROSTOL 25 MCG: 100 TABLET ORAL at 18:43

## 2018-01-22 RX ADMIN — MISOPROSTOL 25 MCG: 100 TABLET ORAL at 10:45

## 2018-01-22 RX ADMIN — MISOPROSTOL 25 MCG: 100 TABLET ORAL at 15:08

## 2018-01-22 RX ADMIN — BUTORPHANOL TARTRATE 1 MG: 1 INJECTION, SOLUTION INTRAMUSCULAR; INTRAVENOUS at 23:15

## 2018-01-22 RX ADMIN — PROMETHAZINE HYDROCHLORIDE 25 MG: 25 TABLET ORAL at 23:14

## 2018-01-22 RX ADMIN — BUTALBITAL, ACETAMINOPHEN AND CAFFEINE 1 TABLET: 50; 325; 40 TABLET ORAL at 15:25

## 2018-01-22 NOTE — H&P
The patient is a 33 y.o. female who is seen for [facility] . Onset was several days ago. Symptoms have been gradually worsening since Modifying measures. Associated symptoms include:  Ha . HISTORY:      Patient's last menstrual period was 04/26/2017 (exact date). Sexual History:  not sexually active  Contraception:    No current facility-administered medications on file prior to encounter. Current Outpatient Prescriptions on File Prior to Encounter   Medication Sig Dispense Refill    polyethylene glycol (MIRALAX) 17 gram packet Take 17 g by mouth daily.  cholecalciferol, vitamin D3, (VITAMIN D3) 2,000 unit tab Take  by mouth.  butalbital-acetaminophen-caffeine (FIORICET, ESGIC) -40 mg per tablet Take 1-2 Tabs by mouth every six (6) hours as needed for Pain. Max Daily Amount: 8 Tabs. 30 Tab 2    PNV 67-iron ps-folate no. 1-dha (VITAFOL ULTRA) 29 mg iron- 1 mg-200 mg cap Take 1 Cap by mouth daily. 30 Cap 11    acetaminophen (TYLENOL) 325 mg tablet Take  by mouth every four (4) hours as needed for Pain. ROS:  Feeling well. No dyspnea or chest pain on exertion. No abdominal pain, change in bowel habits, black or bloody stools. No urinary tract symptoms. GYN ROS: .    PHYSICAL EXAM:  Blood pressure 127/78, pulse 79, height 5' 8\" (1.727 m), weight 229 lb (103.9 kg), last menstrual period 04/26/2017, not currently breastfeeding. The patient appears well, alert, oriented x 3, in no distress. Lungs are clear. Heart RRR, no murmurs. Abdomen soft without tenderness, guarding, mass or organomegaly. Pelvic:   ASSESSMENT:  No diagnosis found. PLAN:    Orders Placed This Encounter    CBC W/O DIFF     Standing Status:   Standing     Number of Occurrences:   1    DIET NPO With Ice Chips     And sips     Standing Status:   Standing     Number of Occurrences:   1     Order Specific Question:   NPO options: Answer:    With [employer]    VITAL SIGNS PER UNIT ROUTINE     More frequently if indicated. Standing Status:   Standing     Number of Occurrences:   1    AMBULATE PATIENT     Ambulate prior to ROM per unit protocol. Standing Status:   Standing     Number of Occurrences:   1    FETAL MONITORING     Standing Status:   Standing     Number of Occurrences:   1    NOTIFY PROVIDER: VITAL SIGNS CHANGES     FHT's Greater than 160 Beats Per Minute or less than 110 Beats Per Minute     Standing Status:   Standing     Number of Occurrences:   1     Order Specific Question:   Notify for Temperature: Answer:   Greater than 100.4 F     Order Specific Question:   Notify for Heart Rate: Answer:   Greater than 120 Beats Per Minute     Order Specific Question:   Notify for Respiratory Rate: Answer:   Greater than 30     Order Specific Question:   Notify for Systolic Blood Pressure: Answer:   Greater than 160  mm Hg or Less than 90 mm Hg    EPIDURAL CATHETER CARE     Catheter Care and Management     Standing Status:   Standing     Number of Occurrences:   1    INTAKE AND OUTPUT     Standing Status:   Standing     Number of Occurrences:   1    STRAIGHT CATHETER (NURSING) PRN Routine As needed for bladder distention/management if epidural is in place. As needed for bladder distention/management if epidural is in place. Standing Status:   Standing     Number of Occurrences:   1    TYPE AND SCREEN     ENTER SURGERY DATE IF FOR PRE-OP TESTING. Standing Status:   Standing     Number of Occurrences:   1     Order Specific Question:   Has patient been transfused or pregnant in the last 3 mos. ? Answer:   Unknown    butorphanol (STADOL) injection 1 mg    lidocaine (XYLOCAINE) 10 mg/mL (1 %) injection 0.1 mL    mineral oil 120 mL    lidocaine (XYLOCAINE) 2 % jelly    miSOPROStol (CYTOTEC) tablet (prepacked) 25 mcg    HYDROcodone-acetaminophen (NORCO) 5-325 mg per tablet     Sig: Take 2 Tabs by mouth every eight (8) hours as needed for Pain.  Indications: Pain     pih sent for iol severe pih  Prot 600  Dx situation w pt gbs-

## 2018-01-22 NOTE — PROGRESS NOTES
[address] Dr. Kitty Oreilly called per RN. Orders for regular diet received. 0622 Dr. Kitty Oreilly called in. Updated per RN. Orders received to continue Cytotec overnight and begin Pitocin at 0700. Orders for Eriberto CHRISTENSEN received.

## 2018-01-22 NOTE — PROGRESS NOTES
Dr Darci Saenz updated to pt status. Here for induction of labor at 38.5 weeks with increased bp's, headache and proteinuria. Will give 25 mcg buccal cytotec.  Per phone report by dr Minal Garcia- pt is 1 cm and posterior

## 2018-01-22 NOTE — IP AVS SNAPSHOT
[id number] [facility] [address] 
[phone] Patient: Cassy Cabral MRN: [id number]:[id number] About your hospitalization You were admitted on:  January 22, 2018 You last received care in the:  [address] [facility] You were discharged on:  January 27, 2018 Why you were hospitalized Your primary diagnosis was:  Pre-Eclampsia In Third Trimester Your diagnoses also included:  First Pregnancy, Headache In Pregnancy, Antepartum, Third Trimester, Proteinuria Affecting Pregnancy In Third Trimester, Polyhydramnios In Third Trimester, Supervision Of High Risk Pregnancy In Second Trimester, Suspected Fetal Anomaly, Antepartum Follow-up Information Follow up With Details Comments Contact Info Jaswant Escalona MD   Patient can only remember the practice name and not the physician Mariely Saldana MD In 6 weeks Follow up in 6 weeks with [facility] [address] [address] 
[phone] Your Scheduled Appointments Wednesday March 07, 2018  9:30 AM EST POSTPARTUM VISIT with Gurpreet Hector MD  
[facility] ([facility] [address] [address]  
[phone] Discharge Orders None A check franklin indicates which time of day the medication should be taken. My Medications START taking these medications Instructions Each Dose to Equal  
 Morning Noon Evening Bedtime  
 ibuprofen 800 mg tablet Commonly known as:  MOTRIN Your last dose was: Your next dose is: Take 1 Tab by mouth every eight (8) hours. 800 mg  
    
   
   
   
  
 oxyCODONE-acetaminophen 7.5-325 mg per tablet Commonly known as:  PERCOCET 7.5 Your last dose was: Your next dose is: Take 1-2 Tabs by mouth every six (6) hours as needed. Max Daily Amount: 8 Tabs. 1-2 Tab CONTINUE taking these medications Instructions Each Dose to Equal  
 Morning Noon Evening Bedtime MIRALAX 17 gram packet Generic drug:  polyethylene glycol Your last dose was: Your next dose is: Take 17 g by mouth daily. 17 g PNV 67-iron ps-folate no. 1-dha 29 mg iron- 1 mg-200 mg Cap Commonly known as:  Walter Fabry Your last dose was: Your next dose is: Take 1 Cap by mouth daily. 1 Cap TYLENOL 325 mg tablet Generic drug:  acetaminophen Your last dose was: Your next dose is: Take  by mouth every four (4) hours as needed for Pain. VITAMIN D3 2,000 unit Tab Generic drug:  cholecalciferol (vitamin D3) Your last dose was: Your next dose is: Take  by mouth. STOP taking these medications   
 butalbital-acetaminophen-caffeine -40 mg per tablet Commonly known as:  FIORICET, ESGIC  
   
  
 NORCO 5-325 mg per tablet Generic drug:  HYDROcodone-acetaminophen Where to Get Your Medications Information on where to get these meds will be given to you by the nurse or doctor. ! Ask your nurse or doctor about these medications  
  ibuprofen 800 mg tablet  
 oxyCODONE-acetaminophen 7.5-325 mg per tablet Discharge Instructions After Your Delivery (the Postpartum Period): Care Instructions Your Care Instructions Congratulations on the birth of your baby. Like pregnancy, the  period can be a time of excitement, yeni, and exhaustion. You may look at your wondrous little baby and feel happy. You may also be overwhelmed by your new sleep hours and new responsibilities. At first, babies often sleep during the days and are awake at night. They do not have a pattern or routine.  They may make sudden gasps, jerk themselves awake, or look like they have crossed eyes. These are all normal, and they may even make you smile. In these first weeks after delivery, try to take good care of yourself. It may take 4 to 6 weeks to feel like yourself again, and possibly longer if you had a  birth. You will likely feel very tired for several weeks. Your days will be full of ups and downs, but lots of yeni as well. Follow-up care is a key part of your treatment and safety. Be sure to make and go to all appointments, and call your doctor if you are having problems. It's also a good idea to know your test results and keep a list of the medicines you take. How can you care for yourself at home? Take care of your body after delivery · Use pads instead of tampons for the bloody flow that may last as long as 2 weeks. · Ease cramps with ibuprofen (Advil, Motrin). · Ease soreness of hemorrhoids and the area between your vagina and rectum with ice compresses or witch hazel pads. · Ease constipation by drinking lots of fluid and eating high-fiber foods. Ask your doctor about over-the-counter stool softeners. · Cleanse yourself with a gentle squeeze of warm water from a bottle instead of wiping with toilet paper. · Take a sitz bath in warm water several times a day. · Wear a good nursing bra. Ease sore and swollen breasts with warm, wet washcloths. · If you are not breastfeeding, use ice rather than heat for breast soreness. · Your period may not start for several months if you are breastfeeding. You may bleed more, and longer at first, than you did before you got pregnant. · Wait until you are healed (about 4 to 6 weeks) before you have sexual intercourse. Your doctor will tell you when it is okay to have sex. · Try not to travel with your baby for 5 or 6 weeks. If you take a long car trip, make frequent stops to walk around and stretch. Avoid exhaustion · Rest every day. Try to nap when your baby naps. · Ask another adult to be with you for a few days after delivery. · Plan for  if you have other children. · Stay flexible so you can eat at odd hours and sleep when you need to. Both you and your baby are making new schedules. · Plan small trips to get out of the house. Change can make you feel less tired. · Ask for help with housework, cooking, and shopping. Remind yourself that your job is to care for your baby. Know about help for postpartum depression · \"Baby blues\" are common for the first 1 to 2 weeks after birth. You may cry or feel sad or irritable for no reason. · Rest whenever you can. Being tired makes it harder to handle your emotions. · Go for walks with your baby. · Talk to your partner, friends, and family about your feelings. · If your symptoms last for more than a few weeks, or if you feel very depressed, ask your doctor for help. · Postpartum depression can be treated. Support groups and counseling can help. Sometimes medicine can also help. Stay healthy · Eat healthy foods so you have more energy, make good breast milk, and lose extra baby pounds. · If you breastfeed, avoid alcohol and drugs. Stay smoke-free. If you quit during pregnancy, congratulations. · Start daily exercise after 4 to 6 weeks, but rest when you feel tired. · Learn exercises to tone your belly. Do Kegel exercises to regain strength in your pelvic muscles. You can do these exercises while you stand or sit. ¨ Squeeze the same muscles you would use to stop your urine. Your belly and thighs should not move. ¨ Hold the squeeze for 3 seconds, and then relax for 3 seconds. ¨ Start with 3 seconds. Then add 1 second each week until you are able to squeeze for 10 seconds. ¨ Repeat the exercise 10 to 15 times for each session. Do three or more sessions each day. · Find a class for new mothers and new babies that has an exercise time.  
· If you had a  birth, give yourself a bit more time before you exercise, and be careful. When should you call for help? Call 911 anytime you think you may need emergency care. For example, call if: 
? · You passed out (lost consciousness). ?Call your doctor now or seek immediate medical care if: 
? · You have severe vaginal bleeding. This means you are passing blood clots and soaking through a pad each hour for 2 or more hours. ? · You are dizzy or lightheaded, or you feel like you may faint. ? · You have a fever. ? · You have new belly pain, or your pain gets worse. ? Watch closely for changes in your health, and be sure to contact your doctor if: 
? · Your vaginal bleeding seems to be getting heavier. ? · You have new or worse vaginal discharge. ? · You feel sad, anxious, or hopeless for more than a few days. ? · You do not get better as expected. Where can you learn more? Go to http://edmar-werner.info/. Enter A461 in the search box to learn more about \"After Your Delivery (the Postpartum Period): Care Instructions. \" Current as of: March 16, 2017 Content Version: 11.4 © 6279-7532 [employer]. Care instructions adapted under license by [employer] (which disclaims liability or warranty for this information). If you have questions about a medical condition or this instruction, always ask your healthcare professional. [address] any warranty or liability for your use of this information. "[employer]" Announcement We are excited to announce that we are making your provider's discharge notes available to you in "[employer] You will see these notes when they are completed and signed by the physician that discharged you from your recent hospital stay.   If you have any questions or concerns about any information you see in "[employer]", please call the Health Information Department where you were seen or reach out to your Primary Care Provider for more information about your plan of care. Introducing [address]! Dear Alyssa Best: 
Thank you for requesting a [employer] account. Our records indicate that you already have an active [employer] account. You can access your account anytime at [email or web address] Did you know that you can access your hospital and ER discharge instructions at any time in [employer]? You can also review all of your test results from your hospital stay or ER visit. Additional Information If you have questions, please visit the Frequently Asked Questions section of the [employer] website at [email or web address] Remember, [employer] is NOT to be used for urgent needs. For medical emergencies, dial 911. Now available from your iPhone and Android! Providers Seen During Your Hospitalization Provider Specialty Primary office phone Kristal Correa MD Obstetrics & Gynecology [phone] Your Primary Care Physician (PCP) Primary Care Physician Office Phone Office Fax OTHER, PHYS ** None ** ** None ** You are allergic to the following Allergen Reactions Pcn (Penicillins) Hives Recent Documentation Height Weight Breastfeeding? BMI OB Status Smoking Status 1.727 m 103.9 kg Unknown 34.82 kg/m2 Recent pregnancy Never Smoker Emergency Contacts Name Discharge Info Relation Home Work Mobile Luis Felipe Blankenship  Spouse [3] [phone] Patient Belongings The following personal items are in your possession at time of discharge: 
  Dental Appliances: None  Visual Aid: None      Home Medications: None   Jewelry: Necklace  Clothing: At bedside, Shirt, With patient, Undergarments, Pants    Other Valuables: At bedside, Elvira Oconnor [place]  Personal Items Sent to Safe: none Please provide this summary of care documentation to your next provider. Signatures-by signing, you are acknowledging that this After Visit Summary has been reviewed with you and you have received a copy. Patient Signature:  ____________________________________________________________ Date:  ____________________________________________________________  
  
Marlyse Leaks Provider Signature:  ____________________________________________________________ Date:  ____________________________________________________________

## 2018-01-22 NOTE — IP AVS SNAPSHOT
[address] 
[phone] Patient: Beatris Severino MRN: [id number] GQR:[id number]/10/[id number] A check franklin indicates which time of day the medication should be taken. My Medications START taking these medications Instructions Each Dose to Equal  
 Morning Noon Evening Bedtime  
 ibuprofen 800 mg tablet Commonly known as:  MOTRIN Your last dose was: Your next dose is: Take 1 Tab by mouth every eight (8) hours. 800 mg  
    
   
   
   
  
 oxyCODONE-acetaminophen 7.5-325 mg per tablet Commonly known as:  PERCOCET 7.5 Your last dose was: Your next dose is: Take 1-2 Tabs by mouth every six (6) hours as needed. Max Daily Amount: 8 Tabs. 1-2 Tab CONTINUE taking these medications Instructions Each Dose to Equal  
 Morning Noon Evening Bedtime MIRALAX 17 gram packet Generic drug:  polyethylene glycol Your last dose was: Your next dose is: Take 17 g by mouth daily. 17 g PNV 67-iron ps-folate no. 1-dha 29 mg iron- 1 mg-200 mg Cap Commonly known as:  Fredna Slipper Your last dose was: Your next dose is: Take 1 Cap by mouth daily. 1 Cap TYLENOL 325 mg tablet Generic drug:  acetaminophen Your last dose was: Your next dose is: Take  by mouth every four (4) hours as needed for Pain. VITAMIN D3 2,000 unit Tab Generic drug:  cholecalciferol (vitamin D3) Your last dose was: Your next dose is: Take  by mouth. STOP taking these medications   
 butalbital-acetaminophen-caffeine -40 mg per tablet Commonly known as:  FIORICET, ESGIC  
   
  
 NORCO 5-325 mg per tablet Generic drug:  HYDROcodone-acetaminophen Where to Get Your Medications Information on where to get these meds will be given to you by the nurse or doctor. ! Ask your nurse or doctor about these medications  
  ibuprofen 800 mg tablet  
 oxyCODONE-acetaminophen 7.5-325 mg per tablet

## 2018-01-23 PROBLEM — Z37.9 NORMAL LABOR: Status: ACTIVE | Noted: 2018-01-23

## 2018-01-23 PROCEDURE — 74011250637 HC RX REV CODE- 250/637: Performed by: OBSTETRICS & GYNECOLOGY

## 2018-01-23 PROCEDURE — 74011258636 HC RX REV CODE- 258/636: Performed by: OBSTETRICS & GYNECOLOGY

## 2018-01-23 PROCEDURE — 65270000029 HC RM PRIVATE

## 2018-01-23 PROCEDURE — 74011250636 HC RX REV CODE- 250/636: Performed by: OBSTETRICS & GYNECOLOGY

## 2018-01-23 RX ORDER — SODIUM CHLORIDE 0.9 % (FLUSH) 0.9 %
5-10 SYRINGE (ML) INJECTION AS NEEDED
Status: DISCONTINUED | OUTPATIENT
Start: 2018-01-23 | End: 2018-01-25

## 2018-01-23 RX ORDER — DEXTROSE, SODIUM CHLORIDE, SODIUM LACTATE, POTASSIUM CHLORIDE, AND CALCIUM CHLORIDE 5; .6; .31; .03; .02 G/100ML; G/100ML; G/100ML; G/100ML; G/100ML
125 INJECTION, SOLUTION INTRAVENOUS CONTINUOUS
Status: DISCONTINUED | OUTPATIENT
Start: 2018-01-23 | End: 2018-01-25

## 2018-01-23 RX ORDER — SODIUM CHLORIDE 0.9 % (FLUSH) 0.9 %
5-10 SYRINGE (ML) INJECTION EVERY 8 HOURS
Status: DISCONTINUED | OUTPATIENT
Start: 2018-01-23 | End: 2018-01-25

## 2018-01-23 RX ORDER — OXYTOCIN/RINGER'S LACTATE 30/500 ML
.5-2 PLASTIC BAG, INJECTION (ML) INTRAVENOUS
Status: DISCONTINUED | OUTPATIENT
Start: 2018-01-24 | End: 2018-01-25

## 2018-01-23 RX ORDER — OXYTOCIN/RINGER'S LACTATE 15/250 ML
250 PLASTIC BAG, INJECTION (ML) INTRAVENOUS ONCE
Status: ACTIVE | OUTPATIENT
Start: 2018-01-23 | End: 2018-01-23

## 2018-01-23 RX ORDER — OXYTOCIN/RINGER'S LACTATE 30/500 ML
PLASTIC BAG, INJECTION (ML) INTRAVENOUS
Status: ACTIVE
Start: 2018-01-23 | End: 2018-01-23

## 2018-01-23 RX ORDER — OXYTOCIN/RINGER'S LACTATE 30/500 ML
1-25 PLASTIC BAG, INJECTION (ML) INTRAVENOUS
Status: DISCONTINUED | OUTPATIENT
Start: 2018-01-23 | End: 2018-01-25

## 2018-01-23 RX ADMIN — DINOPROSTONE 10 MG: 10 INSERT VAGINAL at 18:17

## 2018-01-23 RX ADMIN — OXYTOCIN 2 MILLI-UNITS/MIN: 10 INJECTION, SOLUTION INTRAMUSCULAR; INTRAVENOUS at 07:30

## 2018-01-23 RX ADMIN — MISOPROSTOL 25 MCG: 100 TABLET ORAL at 02:52

## 2018-01-23 RX ADMIN — SODIUM CHLORIDE, SODIUM LACTATE, POTASSIUM CHLORIDE, CALCIUM CHLORIDE, AND DEXTROSE MONOHYDRATE 125 ML/HR: 600; 310; 30; 20; 5 INJECTION, SOLUTION INTRAVENOUS at 07:30

## 2018-01-23 RX ADMIN — SODIUM CHLORIDE, SODIUM LACTATE, POTASSIUM CHLORIDE, CALCIUM CHLORIDE, AND DEXTROSE MONOHYDRATE 125 ML/HR: 600; 310; 30; 20; 5 INJECTION, SOLUTION INTRAVENOUS at 18:06

## 2018-01-23 NOTE — PROGRESS NOTES
SVE performed before cytotec administration. 01/23/18 0248   Cervical Exam   Dilation (cm) 1   Eff 75 %   Station -3   Position Posterior   Cervical Consistency Soft   Vaginal exam done by?   Harry Li RN

## 2018-01-23 NOTE — PROGRESS NOTES
Chart reviewed, pt seen and examined. No c/o at present, states HA resolved yesterday. BPs NL (x one elevated following a cervical exam last PM) and HELLP labs all WNL. Pit @ 10. Cvx 1/th/-3. FHTs reassuring. Will continue induction for PreE. POC discussed, aware could extend into a 2nd day.

## 2018-01-23 NOTE — PROGRESS NOTES
SVE peformed: 1/60/-3     01/22/18 1929   Cervical Exam   Dilation (cm) 1   Eff 60 %   Station -3   Cervical Consistency Soft   Vaginal exam done by?   Liliana Granados Rn

## 2018-01-23 NOTE — PROGRESS NOTES
2227: Dr. Humphrey Chambers at bedside. Portion of EFM strip reviewed. SVE performed by Dr. Humphrey Chambers, see flowsheet. 2232: Cytotec insterted vaginally via Dr. Humphrey Chambers. Patient tolerated well.

## 2018-01-23 NOTE — PROGRESS NOTES
0125: patient OOB to BR.350ml clear, yellow urine noted. 0128: patient assisted back into bed. Patient laying in bed in supine position with HOB elevated. US and TOCO adjusted and tracing.

## 2018-01-23 NOTE — PROGRESS NOTES
Dr. Marcy Espinal called and given update orders turn off Pitocin   Allow patient to ambulate  And eat  Place cervidil tonight

## 2018-01-23 NOTE — PROGRESS NOTES
Patient update with Dr. Kitty Oreilly with recent SVE exam, EFM strip reactive, FHT's 135 with moderate variablity and accelerations. Dr[facility] vaginally, followed by cytotec orally at [phone]. Orders to give stadol 1 mg for pain PRN every 3 hours, phenergan PO PRN every 3 hours until dilation of 3cm with 0 station. Once dilated to 3cm and station ok to receive epidrual. Verbalized understanding.

## 2018-01-23 NOTE — PROGRESS NOTES
Virtually no cervical change, baby still very high despite cytotec and pitocin. Not really feeling any contractions. Will stop, allow to eat and shower, the change to cervidil.

## 2018-01-24 ENCOUNTER — ANESTHESIA EVENT (OUTPATIENT)
Dept: LABOR AND DELIVERY | Age: 28
End: 2018-01-24
Payer: COMMERCIAL

## 2018-01-24 ENCOUNTER — ANESTHESIA (OUTPATIENT)
Dept: LABOR AND DELIVERY | Age: 28
End: 2018-01-24
Payer: COMMERCIAL

## 2018-01-24 PROCEDURE — 77030005518 HC CATH URETH FOL 2W BARD -B

## 2018-01-24 PROCEDURE — 74011250636 HC RX REV CODE- 250/636: Performed by: OBSTETRICS & GYNECOLOGY

## 2018-01-24 PROCEDURE — 74011250636 HC RX REV CODE- 250/636

## 2018-01-24 PROCEDURE — A4300 CATH IMPL VASC ACCESS PORTAL: HCPCS | Performed by: ANESTHESIOLOGY

## 2018-01-24 PROCEDURE — 77030011943

## 2018-01-24 PROCEDURE — 77030014125 HC TY EPDRL BBMI -B: Performed by: ANESTHESIOLOGY

## 2018-01-24 PROCEDURE — 74011250637 HC RX REV CODE- 250/637: Performed by: OBSTETRICS & GYNECOLOGY

## 2018-01-24 PROCEDURE — 74011258636 HC RX REV CODE- 258/636: Performed by: OBSTETRICS & GYNECOLOGY

## 2018-01-24 PROCEDURE — 10H07YZ INSERTION OF OTHER DEVICE INTO PRODUCTS OF CONCEPTION, VIA NATURAL OR ARTIFICIAL OPENING: ICD-10-PCS | Performed by: OBSTETRICS & GYNECOLOGY

## 2018-01-24 PROCEDURE — 0T9B70Z DRAINAGE OF BLADDER WITH DRAINAGE DEVICE, VIA NATURAL OR ARTIFICIAL OPENING: ICD-10-PCS | Performed by: OBSTETRICS & GYNECOLOGY

## 2018-01-24 PROCEDURE — 65270000029 HC RM PRIVATE

## 2018-01-24 RX ORDER — ROPIVACAINE HYDROCHLORIDE 2 MG/ML
INJECTION, SOLUTION EPIDURAL; INFILTRATION; PERINEURAL
Status: DISCONTINUED | OUTPATIENT
Start: 2018-01-24 | End: 2018-01-25 | Stop reason: HOSPADM

## 2018-01-24 RX ORDER — ROPIVACAINE HYDROCHLORIDE 5 MG/ML
INJECTION, SOLUTION EPIDURAL; INFILTRATION; PERINEURAL AS NEEDED
Status: DISCONTINUED | OUTPATIENT
Start: 2018-01-24 | End: 2018-01-25 | Stop reason: HOSPADM

## 2018-01-24 RX ORDER — ONDANSETRON 2 MG/ML
4 INJECTION INTRAMUSCULAR; INTRAVENOUS ONCE
Status: COMPLETED | OUTPATIENT
Start: 2018-01-24 | End: 2018-01-24

## 2018-01-24 RX ADMIN — SODIUM CHLORIDE, SODIUM LACTATE, POTASSIUM CHLORIDE, CALCIUM CHLORIDE, AND DEXTROSE MONOHYDRATE 125 ML/HR: 600; 310; 30; 20; 5 INJECTION, SOLUTION INTRAVENOUS at 08:33

## 2018-01-24 RX ADMIN — OXYTOCIN 18 MILLI-UNITS/MIN: 10 INJECTION, SOLUTION INTRAMUSCULAR; INTRAVENOUS at 20:55

## 2018-01-24 RX ADMIN — PROMETHAZINE HYDROCHLORIDE 25 MG: 25 TABLET ORAL at 00:59

## 2018-01-24 RX ADMIN — OXYTOCIN 20 MILLI-UNITS/MIN: 10 INJECTION, SOLUTION INTRAMUSCULAR; INTRAVENOUS at 21:30

## 2018-01-24 RX ADMIN — ROPIVACAINE HYDROCHLORIDE 5 ML: 5 INJECTION, SOLUTION EPIDURAL; INFILTRATION; PERINEURAL at 22:05

## 2018-01-24 RX ADMIN — ROPIVACAINE HYDROCHLORIDE 10 ML/HR: 2 INJECTION, SOLUTION EPIDURAL; INFILTRATION; PERINEURAL at 14:48

## 2018-01-24 RX ADMIN — ONDANSETRON 4 MG: 2 INJECTION INTRAMUSCULAR; INTRAVENOUS at 20:58

## 2018-01-24 RX ADMIN — OXYTOCIN 2 MILLI-UNITS/MIN: 10 INJECTION, SOLUTION INTRAMUSCULAR; INTRAVENOUS at 05:28

## 2018-01-24 RX ADMIN — BUTORPHANOL TARTRATE 1 MG: 1 INJECTION, SOLUTION INTRAMUSCULAR; INTRAVENOUS at 12:41

## 2018-01-24 RX ADMIN — SODIUM CHLORIDE, SODIUM LACTATE, POTASSIUM CHLORIDE, CALCIUM CHLORIDE, AND DEXTROSE MONOHYDRATE 125 ML/HR: 600; 310; 30; 20; 5 INJECTION, SOLUTION INTRAVENOUS at 16:27

## 2018-01-24 RX ADMIN — OXYTOCIN 16 MILLI-UNITS/MIN: 10 INJECTION, SOLUTION INTRAMUSCULAR; INTRAVENOUS at 20:22

## 2018-01-24 RX ADMIN — ROPIVACAINE HYDROCHLORIDE 5 ML: 5 INJECTION, SOLUTION EPIDURAL; INFILTRATION; PERINEURAL at 22:10

## 2018-01-24 RX ADMIN — BUTORPHANOL TARTRATE 1 MG: 1 INJECTION, SOLUTION INTRAMUSCULAR; INTRAVENOUS at 01:00

## 2018-01-24 NOTE — ANESTHESIA PREPROCEDURE EVALUATION
Anesthetic History   No history of anesthetic complications            Review of Systems / Medical History  Patient summary reviewed, nursing notes reviewed and pertinent labs reviewed    Pulmonary  Within defined limits                 Neuro/Psych   Within defined limits           Cardiovascular    Hypertension: well controlled              Exercise tolerance: >4 METS  Comments: Pre-eclampsia   GI/Hepatic/Renal  Within defined limits              Endo/Other  Within defined limits           Other Findings              Physical Exam    Airway  Mallampati: II  TM Distance: 4 - 6 cm  Neck ROM: normal range of motion   Mouth opening: Normal     Cardiovascular    Rhythm: regular           Dental  No notable dental hx       Pulmonary  Breath sounds clear to auscultation               Abdominal  GI exam deferred       Other Findings            Anesthetic Plan    ASA: 3  Anesthesia type: epidural            Anesthetic plan and risks discussed with: Patient and Spouse

## 2018-01-24 NOTE — PROGRESS NOTES
Discussed POC with patient to induce with cervidil. Patient instructed to remain in bed for 2 hours after placement. Ok to get up to use the bathroom, and to pat dry to clean perineum. Patient instructed to eat 2 hours after placement, and then able to eat after the 2 hours is up, and nothing to eat after midnight. Patient verbalizes understanding.

## 2018-01-24 NOTE — PROGRESS NOTES
Dr. Sylvia Tesfaye to bedside to review fetal hear rate tracing   Post prolonged decel  POC is to keep Pitocin off for 30 minutes and then restart Pitocin low dose

## 2018-01-24 NOTE — PROGRESS NOTES
5/C/-1, FHTs 140s with accels. Pitocin @ 8 mu/min. Turned off earlier when baby developed  multiple variables (w/in 1 hour of DANITA placement). Continue slowly titrating up.

## 2018-01-24 NOTE — ANESTHESIA PROCEDURE NOTES
Epidural Block    Start time: 1/24/2018 2:33 PM  End time: 1/24/2018 2:50 PM  Performed by: Luis Alberto Cohen by: Ella Arreola     Pre-Procedure  Indication: labor epidural    Preanesthetic Checklist: patient identified, risks and benefits discussed, anesthesia consent, site marked, patient being monitored, timeout performed and anesthesia consent    Timeout Time: 14:35        Epidural:   Patient position:  Seated  Prep region:  Lumbar  Prep: Chlorhexidine    Location:  L4-5    Needle and Epidural Catheter:   Needle Type:  Tuohy  Needle Gauge:  17 G  Injection Technique:  Loss of resistance using air  Attempts:  1  Catheter Size:  19 G  Catheter at Skin Depth (cm):  12  Depth in Epidural Space (cm):  5  Events: no blood with aspiration, no cerebrospinal fluid with aspiration, no paresthesia and negative aspiration test    Test Dose:  Negative (Negative IV and SAB test dose at 1443)    Assessment:   Catheter Secured:  Tegaderm and tape  Insertion:  Uncomplicated  Patient tolerance:  Patient tolerated the procedure well with no immediate complications

## 2018-01-24 NOTE — PROGRESS NOTES
Cervidil taken out. SVE performed: 3/75/-3. Patient c/o of pain during cervical exam. Patient repositioned in bed. US readjusted and begin pitocin as ordered.

## 2018-01-24 NOTE — PROGRESS NOTES
140s with accels. 2/50/-2/posterior, AROM  Having a girl, \"Brennan\",   EFW 1/4/18:  7 lb 10 oz, AC 96%.   EFW 1/22/18:  8 lb, HC/AC 0.98

## 2018-01-25 LAB
BASE DEFICIT BLDCOA-SCNC: 7.8 MMOL/L (ref 0–2)
BASE DEFICIT BLDCOV-SCNC: 7 MMOL/L (ref 1.9–7.7)
BDY SITE: ABNORMAL
BDY SITE: ABNORMAL
HCO3 BLDCOA-SCNC: 21 MMOL/L (ref 22–26)
HCO3 BLDV-SCNC: 19 MMOL/L
PCO2 BLDCOA: 55 MMHG (ref 33–49)
PCO2 BLDCOV: 39 MMHG (ref 14.1–43.3)
PH BLDCOA: 7.2 [PH] (ref 7.21–7.31)
PH BLDCOV: 7.3 [PH] (ref 7.2–7.44)
PO2 BLDCOA: 14 MMHG (ref 9–19)
PO2 BLDV: 25 MMHG (ref 30.4–57.2)
SERVICE CMNT-IMP: ABNORMAL
SERVICE CMNT-IMP: ABNORMAL

## 2018-01-25 PROCEDURE — 76060000078 HC EPIDURAL ANESTHESIA

## 2018-01-25 PROCEDURE — 74011250637 HC RX REV CODE- 250/637: Performed by: OBSTETRICS & GYNECOLOGY

## 2018-01-25 PROCEDURE — 77030003028 HC SUT VCRL J&J -A

## 2018-01-25 PROCEDURE — 82803 BLOOD GASES ANY COMBINATION: CPT

## 2018-01-25 PROCEDURE — 65270000029 HC RM PRIVATE

## 2018-01-25 PROCEDURE — 77030011945 HC CATH URIN INT ST MENT -A

## 2018-01-25 PROCEDURE — 74011000250 HC RX REV CODE- 250: Performed by: OBSTETRICS & GYNECOLOGY

## 2018-01-25 PROCEDURE — 77030018846 HC SOL IRR STRL H20 ICUM -A

## 2018-01-25 PROCEDURE — 10907ZC DRAINAGE OF AMNIOTIC FLUID, THERAPEUTIC FROM PRODUCTS OF CONCEPTION, VIA NATURAL OR ARTIFICIAL OPENING: ICD-10-PCS | Performed by: OBSTETRICS & GYNECOLOGY

## 2018-01-25 PROCEDURE — 0KQM0ZZ REPAIR PERINEUM MUSCLE, OPEN APPROACH: ICD-10-PCS | Performed by: OBSTETRICS & GYNECOLOGY

## 2018-01-25 PROCEDURE — 0UJD7ZZ INSPECTION OF UTERUS AND CERVIX, VIA NATURAL OR ARTIFICIAL OPENING: ICD-10-PCS | Performed by: OBSTETRICS & GYNECOLOGY

## 2018-01-25 PROCEDURE — 77010026065 HC OXYGEN MINIMUM MEDICAL AIR

## 2018-01-25 PROCEDURE — 75410000000 HC DELIVERY VAGINAL/SINGLE

## 2018-01-25 PROCEDURE — 3E033VJ INTRODUCTION OF OTHER HORMONE INTO PERIPHERAL VEIN, PERCUTANEOUS APPROACH: ICD-10-PCS | Performed by: OBSTETRICS & GYNECOLOGY

## 2018-01-25 PROCEDURE — 75410000002 HC LABOR FEE PER 1 HR

## 2018-01-25 PROCEDURE — 75410000003 HC RECOV DEL/VAG/CSECN EA 0.5 HR

## 2018-01-25 RX ORDER — METHYLERGONOVINE MALEATE 0.2 MG/ML
0.2 INJECTION INTRAVENOUS
Status: DISCONTINUED | OUTPATIENT
Start: 2018-01-25 | End: 2018-01-27 | Stop reason: HOSPADM

## 2018-01-25 RX ORDER — ONDANSETRON 8 MG/1
8 TABLET, ORALLY DISINTEGRATING ORAL
Status: DISCONTINUED | OUTPATIENT
Start: 2018-01-25 | End: 2018-01-27 | Stop reason: HOSPADM

## 2018-01-25 RX ORDER — IBUPROFEN 800 MG/1
800 TABLET ORAL EVERY 8 HOURS
Status: DISCONTINUED | OUTPATIENT
Start: 2018-01-25 | End: 2018-01-27 | Stop reason: HOSPADM

## 2018-01-25 RX ORDER — ACETAMINOPHEN 500 MG
TABLET ORAL
Status: DISCONTINUED
Start: 2018-01-25 | End: 2018-01-25

## 2018-01-25 RX ORDER — SODIUM CHLORIDE 9 MG/ML
3 INJECTION INTRAMUSCULAR; INTRAVENOUS; SUBCUTANEOUS AS NEEDED
Status: DISCONTINUED | OUTPATIENT
Start: 2018-01-25 | End: 2018-01-25

## 2018-01-25 RX ORDER — NALOXONE HYDROCHLORIDE 0.4 MG/ML
0.4 INJECTION, SOLUTION INTRAMUSCULAR; INTRAVENOUS; SUBCUTANEOUS
Status: DISCONTINUED | OUTPATIENT
Start: 2018-01-25 | End: 2018-01-27 | Stop reason: HOSPADM

## 2018-01-25 RX ORDER — OXYTOCIN/RINGER'S LACTATE 30/500 ML
250 PLASTIC BAG, INJECTION (ML) INTRAVENOUS ONCE
Status: ACTIVE | OUTPATIENT
Start: 2018-01-25 | End: 2018-01-25

## 2018-01-25 RX ORDER — SODIUM CHLORIDE 0.9 % (FLUSH) 0.9 %
5 SYRINGE (ML) INJECTION AS NEEDED
Status: DISCONTINUED | OUTPATIENT
Start: 2018-01-25 | End: 2018-01-25

## 2018-01-25 RX ORDER — HYDROMORPHONE HYDROCHLORIDE 2 MG/ML
1 INJECTION, SOLUTION INTRAMUSCULAR; INTRAVENOUS; SUBCUTANEOUS
Status: DISCONTINUED | OUTPATIENT
Start: 2018-01-25 | End: 2018-01-27 | Stop reason: HOSPADM

## 2018-01-25 RX ORDER — ACETAMINOPHEN 500 MG
1000 TABLET ORAL
Status: DISCONTINUED | OUTPATIENT
Start: 2018-01-25 | End: 2018-01-25

## 2018-01-25 RX ORDER — DOCUSATE SODIUM 100 MG/1
100 CAPSULE, LIQUID FILLED ORAL 2 TIMES DAILY
Status: DISCONTINUED | OUTPATIENT
Start: 2018-01-25 | End: 2018-01-27 | Stop reason: HOSPADM

## 2018-01-25 RX ORDER — OXYCODONE AND ACETAMINOPHEN 7.5; 325 MG/1; MG/1
1-2 TABLET ORAL
Status: DISCONTINUED | OUTPATIENT
Start: 2018-01-25 | End: 2018-01-27 | Stop reason: HOSPADM

## 2018-01-25 RX ORDER — DIPHENHYDRAMINE HCL 25 MG
25 CAPSULE ORAL
Status: DISCONTINUED | OUTPATIENT
Start: 2018-01-25 | End: 2018-01-27 | Stop reason: HOSPADM

## 2018-01-25 RX ORDER — SIMETHICONE 80 MG
80 TABLET,CHEWABLE ORAL
Status: DISCONTINUED | OUTPATIENT
Start: 2018-01-25 | End: 2018-01-27 | Stop reason: HOSPADM

## 2018-01-25 RX ADMIN — MINERAL OIL 120 ML: 471.95 OIL ORAL at 01:00

## 2018-01-25 RX ADMIN — BENZOCAINE AND MENTHOL 1 SPRAY: 20; .5 SPRAY TOPICAL at 12:35

## 2018-01-25 RX ADMIN — IBUPROFEN 800 MG: 800 TABLET ORAL at 02:29

## 2018-01-25 RX ADMIN — OXYCODONE HYDROCHLORIDE AND ACETAMINOPHEN 1 TABLET: 7.5; 325 TABLET ORAL at 17:45

## 2018-01-25 RX ADMIN — OXYCODONE HYDROCHLORIDE AND ACETAMINOPHEN 1 TABLET: 7.5; 325 TABLET ORAL at 02:29

## 2018-01-25 RX ADMIN — IBUPROFEN 800 MG: 800 TABLET ORAL at 12:35

## 2018-01-25 RX ADMIN — ACETAMINOPHEN 1000 MG: 500 TABLET, FILM COATED ORAL at 01:29

## 2018-01-25 RX ADMIN — LIDOCAINE HYDROCHLORIDE 0.1 ML: 10 INJECTION, SOLUTION INFILTRATION; PERINEURAL at 01:00

## 2018-01-25 RX ADMIN — OXYCODONE HYDROCHLORIDE AND ACETAMINOPHEN 1 TABLET: 7.5; 325 TABLET ORAL at 16:48

## 2018-01-25 RX ADMIN — OXYCODONE HYDROCHLORIDE AND ACETAMINOPHEN 2 TABLET: 7.5; 325 TABLET ORAL at 23:21

## 2018-01-25 RX ADMIN — DOCUSATE SODIUM 100 MG: 100 CAPSULE, LIQUID FILLED ORAL at 17:45

## 2018-01-25 RX ADMIN — WITCH HAZEL 1 PAD: 500 SOLUTION RECTAL; TOPICAL at 05:26

## 2018-01-25 RX ADMIN — IBUPROFEN 800 MG: 800 TABLET ORAL at 21:47

## 2018-01-25 RX ADMIN — DOCUSATE SODIUM 100 MG: 100 CAPSULE, LIQUID FILLED ORAL at 12:35

## 2018-01-25 NOTE — PROGRESS NOTES
Repair complete pericare performed underpad/gown changed. Patient repositioned in labor bed for better comfort.

## 2018-01-25 NOTE — L&D DELIVERY NOTE
Delivery Summary    Patient: Vani Maher MRN: [id number]  SSN: [id number]    YOB: 1990  Age: 33 y.o. Sex: female       Information for the patient's :  Yusra Talbert [[id number]]       Labor Events:    Labor: No   Rupture Date: 2018   Rupture Time: 11:40 AM   Rupture Type AROM   Amniotic Fluid Volume: Large    Amniotic Fluid Description: Clear None   Induction: Prostaglandins; Oxytocin       Augmentation: None   Labor Events: None     Cervical Ripenin2018 10:45 AM Misoprostol     Delivery Events:  Episiotomy: None   Laceration(s): Second degree perineal     Repaired:      Number of Repair Packets:     Suture Type and Size: Vicryl 2-0  Vicryl 3-0     Estimated Blood Loss (ml): 100ml       Delivery Date: 2018    Delivery Time: 12:52 AM  Delivery Type: Vaginal, Spontaneous Delivery  Sex:  Female     Gestational Age: 36w3d   Delivery Clinician:  Gee Newton  Living Status: Living   [facility]          APGARS  One minute Five minutes Ten minutes   Skin color:            Heart rate:            Grimace:            Muscle tone:            Breathing: Totals:                Presentation: Vertex    Position: Middle Occiput Anterior  Resuscitation Method:  Suctioning-deep; Tactile Stimulation     Meconium Stained: None      Cord Vessels: 3 Vessels      Cord Events:    Cord Blood Sent?:  Yes    Blood Gases Sent?:  Yes    Placenta:  Date/Time:   1:10 AM  Removal: Spontaneous      Appearance: Normal     [place] Measurements:  Birth Weight: 9 lb 1.2 oz (4.115 kg)      Birth Length: 51.5 cm      Head Circumference:        Chest Circumference:       Abdominal Girth:       Other Providers:   Angela CORONADO;MARLEY ESQUIVEL;GUERA CASTRO;YESICA DESHPANDE;CELESTE PANTOJA;REECE BROOKS;Vipin BURGER MARLO S., Obstetrician;Primary Nurse;Charge Nurse;Neonatologist;Respiratory Therapist;Staff Nurse;Scrub Tech;Scrub [facility] Strep:   Lab Results   Component Value Date/Time    Harjit, External Negative 2018     Information for the patient's :  Yusra Talbert [[id number]]   No results found for: ABORH, PCTABR, PCTDIG, BILI, ABORHEXT, ABORH    Lab Results   Component Value Date/Time    APH 7.196 (L) 2018 12:52 AM    APCO2 55 (H) 2018 12:52 AM    APO2 14 2018 12:52 AM    AHCO3 21 (L) 2018 12:52 AM    ABDC 7.8 (H) 2018 12:52 AM    EPHV 7.300 2018 12:52 AM    PCO2V 39 2018 12:52 AM    PO2V 25 (L) 2018 12:52 AM    HCO3V 19 2018 12:52 AM    EBDV 7.0 2018 12:52 AM    SITE CORD 2018 12:52 AM    SITE CORD 2018 12:52 AM    RSCOM n a at 2018 1 03 35 AM. Not read back. 2018 12:52 AM    RSCOM n a at 2018 1 05 52 AM. Not read back. 2018 12:52 AM          C/C/+2----> over a 2nd degree perineal lac and bilateral vaginal lacs just inside the introitus, nares/mouth bulb suctioned on the perineum. Tight nuchal cord x 2 reduced in the course of delivery. After delayed cord clamping the cord was doubly clamped and cut. Baby was placed in the warmer. 3V cord. Cord gas & cord blood obtained. Placenta spontaneous/intact. Intrauterine manual exploration:  no placental remnants were obtained, clots removed, fundus firm. Recto-vaginal exam:  intact along full extent, no buttonhole. No cervical or periurethral lacs. Partial 2nd degree lac and lateral vaginal lacs repaired w/  3-0 V in the usual fashion. Mom stable immediately post delivery. Baby \" [place] \" to SCN secondary to respiratory issues.

## 2018-01-25 NOTE — PROGRESS NOTES
Admission assessment complete. Pt resting in bed quietly with spouse present. Fundus firm at umbilicus with scant amount of bleeding noted on boy-pad. Pt declines further needs at this time. Instructed patient to call out with any questions or concerns and she verbalizes understanding.

## 2018-01-25 NOTE — PROGRESS NOTES
SBAR IN Report: Mother    Verbal report received from Jordon Mims RN on this patient, who is now being transferred from L&D (unit) for routine progression of care. The patient is not wearing a green \"Anesthesia-Duramorph\" band. Report consisted of patient's Situation, Background, Assessment and Recommendations (SBAR).  ID bands were compared with the identification form, and verified with the patient and transferring nurse. Information from the SBAR and the Bev Report was reviewed with the transferring nurse; opportunity for questions and clarification provided.

## 2018-01-25 NOTE — ANESTHESIA POSTPROCEDURE EVALUATION
Post-Anesthesia Evaluation and Assessment    Patient: Arthur Qureshi MRN: [id number]  SSN: [id number]    YOB: 1990  Age: 33 y.o. Sex: female       Cardiovascular Function/Vital Signs  Visit Vitals    /75 (BP 1 Location: Right arm, BP Patient Position: At rest)    Pulse 97    Temp 36.8 °C (98.3 °F)    Resp 18    Ht 5' 8\" (1.727 m)    Wt 103.9 kg (229 lb)    SpO2 100%    Breastfeeding Unknown    BMI 34.82 kg/m2       Patient is status post epidural anesthesia for * No procedures listed *. Nausea/Vomiting: None    Postoperative hydration reviewed and adequate. Pain:  Pain Scale 1: Numeric (0 - 10) (01/25/18 0346)  Pain Intensity 1: 4 (01/25/18 0346)   Managed    Neurological Status: At baseline    Mental Status and Level of Consciousness: Arousable    Pulmonary Status:   O2 Device: Room air (01/25/18 5622)   Adequate oxygenation and airway patent    Complications related to anesthesia: None    Post-anesthesia assessment completed. No concerns    The patient was satisfied with her labor epidural and denies any complications. Her lower extremities have returned to baseline neurologically.     Signed By: Adrianne Crocker MD     January 25, 2018

## 2018-01-25 NOTE — PROGRESS NOTES
Patient transferred to MIU via wheelchair; patient taken to see  in NCU prior to arriving to postpartum room accompanied by this RN and spouse

## 2018-01-25 NOTE — PROGRESS NOTES
RN at bedside patient repositioned to right lateral; cardio readjusted.  On peanut birthing ball; underpad changed

## 2018-01-25 NOTE — PROGRESS NOTES
Assessment completed as noted, plan of care discussed, educated on boy care, when to call RN about bleeding/clots, instructed to keep up with I & O's, patient is wanting to breast feed, pump brought to room and educated on frequency, duration and cleaning, assisted with pumping, drops received

## 2018-01-25 NOTE — PROGRESS NOTES
Late entry - head delivered of infant at 0052; head of bed lowered Sancho performed by Tre Catalan RN/LLAY Jacques RN.  Suprapubic pressure performed by Tre Catalan RN with instruction by MD. Delivery of baby girl at 0068 tactile stimulation performed cord clamped and cut transferred to radiant warmer for further evaluation

## 2018-01-25 NOTE — PROGRESS NOTES
Pushing started will at this time legs placed in stirrups; will continue to assess fetal heart tones every 5 minutes

## 2018-01-25 NOTE — PROGRESS NOTES
Post-Partum Day Number 0 Progress Note    Patient doing well post-partum without significant complaint. Voiding withour difficulty, normal lochia. Vitals:  Patient Vitals for the past 8 hrs:   BP Temp Pulse Resp SpO2   18 0745 123/81 98.3 °F (36.8 °C) 85 18 100 %   18 0515 118/74 98.1 °F (36.7 °C) 83 16 -   18 0415 129/75 98.3 °F (36.8 °C) 97 18 100 %   18 0346 128/85 98.4 °F (36.9 °C) 90 19 -   18 0321 136/75 - 92 - -   18 0313 132/80 - 79 - -   18 0258 129/78 - 84 - -   18 0244 134/80 - 81 - -   18 0229 128/75 - 83 - -     Temp (24hrs), Av.4 °F (36.9 °C), Min:98.1 °F (36.7 °C), Max:100.5 °F (38.1 °C)      Vital signs stable, afebrile. Exam:  Patient without distress. Abdomen soft, fundus firm at level of umbilicus, nontender               Perineum with normal lochia noted. Lower extremities are negative for swelling, cords or tenderness. Lab/Data Review: All lab results for the last 24 hours reviewed. Assessment and Plan:  Patient appears to be having uncomplicated post-partum course. Continue routine perineal care and maternal education. Plan discharge tomorrow if no problems occur.

## 2018-01-26 PROCEDURE — 65270000029 HC RM PRIVATE

## 2018-01-26 PROCEDURE — 74011250637 HC RX REV CODE- 250/637: Performed by: OBSTETRICS & GYNECOLOGY

## 2018-01-26 RX ADMIN — IBUPROFEN 800 MG: 800 TABLET ORAL at 05:33

## 2018-01-26 RX ADMIN — IBUPROFEN 800 MG: 800 TABLET ORAL at 22:19

## 2018-01-26 RX ADMIN — OXYCODONE HYDROCHLORIDE AND ACETAMINOPHEN 2 TABLET: 7.5; 325 TABLET ORAL at 16:06

## 2018-01-26 RX ADMIN — OXYCODONE HYDROCHLORIDE AND ACETAMINOPHEN 1 TABLET: 7.5; 325 TABLET ORAL at 23:56

## 2018-01-26 RX ADMIN — DOCUSATE SODIUM 100 MG: 100 CAPSULE, LIQUID FILLED ORAL at 16:03

## 2018-01-26 RX ADMIN — OXYCODONE HYDROCHLORIDE AND ACETAMINOPHEN 2 TABLET: 7.5; 325 TABLET ORAL at 05:33

## 2018-01-26 NOTE — ADT AUTH CERT NOTES
MOM by Ramón Nath RN        Review Entered Review Status       2018 In Primary       Details         Delivery Information Mom     Patient: Anastasiya Ann MRN: [id number]  SSN:     YOB: 1990  Age: 33 y.o.   Sex: female        Information for the patient's :  Bobby Bell [[id number]]   Delivery Date: 2018   Delivery Time: 12:52 AM   Delivery Type: Vaginal, Spontaneous Delivery  Sex:  female    Gestational Age: 36w3d  Delivery Clinician: Isi Morales  Delivery Location: L&D

## 2018-01-26 NOTE — LACTATION NOTE
In to see mom for first time. Infant is in the NCN. Mom has been pumping and is getting moisture around the inside of flange. Informed mom that this is normal and instructed her to continue to pump every 3 hours. Reviewed with mom the expectations of when her milk may come in and also discussed how having a breast reduction could affect her milk supply. Gave her a handout out as well. Mom has two personal breast pumps at home but I also informed her of the rental pumps we have a available and recommended that she rent one for at least two weeks when she is discharged to home. Lactation consultant will follow up as needed.

## 2018-01-26 NOTE — PROGRESS NOTES
Patient called out stating she passed a clot, clot observed on pad, slightly smaller than golf ball size,scant lochia observed on same pad, reinforced when to call out for heavy bleeding/clots, instructed to recheck pad in 30-45 minutes

## 2018-01-26 NOTE — LACTATION NOTE
This note was copied from a baby's chart. In to see mom for follow up. Infant in SCN. Per parents came of CPAP just recently and attempted x 1 at breast. Infant did not latch but did some rooting. Encouraged mom to continue to do some skin to skin with baby as allowed and allow breast play, attempt to latch. Discussed importance to still pump at least 8 times per day. Mom states starting to get several mls of colostrum today with last pumping session. Reviewed pumping normal volumes for first week with mom, answered questions. Showed dad how to take apart pump parts to clean thoroughly and what to check if pump not suctioning well. No further needs or questions at this time.

## 2018-01-27 VITALS
BODY MASS INDEX: 34.71 KG/M2 | DIASTOLIC BLOOD PRESSURE: 81 MMHG | RESPIRATION RATE: 18 BRPM | TEMPERATURE: 98 F | HEART RATE: 76 BPM | WEIGHT: 229 LBS | OXYGEN SATURATION: 100 % | HEIGHT: 68 IN | SYSTOLIC BLOOD PRESSURE: 136 MMHG

## 2018-01-27 PROCEDURE — 74011250637 HC RX REV CODE- 250/637: Performed by: OBSTETRICS & GYNECOLOGY

## 2018-01-27 RX ORDER — OXYCODONE AND ACETAMINOPHEN 7.5; 325 MG/1; MG/1
1-2 TABLET ORAL
Qty: 30 TAB | Refills: 0 | Status: SHIPPED | OUTPATIENT
Start: 2018-01-27 | End: 2018-02-21

## 2018-01-27 RX ORDER — IBUPROFEN 800 MG/1
800 TABLET ORAL EVERY 8 HOURS
Qty: 100 TAB | Refills: 2 | Status: SHIPPED | OUTPATIENT
Start: 2018-01-27 | End: 2022-05-20 | Stop reason: ALTCHOICE

## 2018-01-27 RX ADMIN — DOCUSATE SODIUM 100 MG: 100 CAPSULE, LIQUID FILLED ORAL at 09:37

## 2018-01-27 RX ADMIN — IBUPROFEN 800 MG: 800 TABLET ORAL at 05:33

## 2018-01-27 NOTE — DISCHARGE INSTRUCTIONS
After Your Delivery (the Postpartum Period): Care Instructions  Your Care Instructions    Congratulations on the birth of your baby. Like pregnancy, the  period can be a time of excitement, yeni, and exhaustion. You may look at your wondrous little baby and feel happy. You may also be overwhelmed by your new sleep hours and new responsibilities. At first, babies often sleep during the days and are awake at night. They do not have a pattern or routine. They may make sudden gasps, jerk themselves awake, or look like they have crossed eyes. These are all normal, and they may even make you smile. In these first weeks after delivery, try to take good care of yourself. It may take 4 to 6 weeks to feel like yourself again, and possibly longer if you had a  birth. You will likely feel very tired for several weeks. Your days will be full of ups and downs, but lots of yeni as well. Follow-up care is a key part of your treatment and safety. Be sure to make and go to all appointments, and call your doctor if you are having problems. It's also a good idea to know your test results and keep a list of the medicines you take. How can you care for yourself at home? Take care of your body after delivery  · Use pads instead of tampons for the bloody flow that may last as long as 2 weeks. · Ease cramps with ibuprofen (Advil, Motrin). · Ease soreness of hemorrhoids and the area between your vagina and rectum with ice compresses or witch hazel pads. · Ease constipation by drinking lots of fluid and eating high-fiber foods. Ask your doctor about over-the-counter stool softeners. · Cleanse yourself with a gentle squeeze of warm water from a bottle instead of wiping with toilet paper. · Take a sitz bath in warm water several times a day. · Wear a good nursing bra. Ease sore and swollen breasts with warm, wet washcloths. · If you are not breastfeeding, use ice rather than heat for breast soreness.   · Your period may not start for several months if you are breastfeeding. You may bleed more, and longer at first, than you did before you got pregnant. · Wait until you are healed (about 4 to 6 weeks) before you have sexual intercourse. Your doctor will tell you when it is okay to have sex. · Try not to travel with your baby for 5 or 6 weeks. If you take a long car trip, make frequent stops to walk around and stretch. Avoid exhaustion  · Rest every day. Try to nap when your baby naps. · Ask another adult to be with you for a few days after delivery. · Plan for  if you have other children. · Stay flexible so you can eat at odd hours and sleep when you need to. Both you and your baby are making new schedules. · Plan small trips to get out of the house. Change can make you feel less tired. · Ask for help with housework, cooking, and shopping. Remind yourself that your job is to care for your baby. Know about help for postpartum depression  · \"Baby blues\" are common for the first 1 to 2 weeks after birth. You may cry or feel sad or irritable for no reason. · Rest whenever you can. Being tired makes it harder to handle your emotions. · Go for walks with your baby. · Talk to your partner, friends, and family about your feelings. · If your symptoms last for more than a few weeks, or if you feel very depressed, ask your doctor for help. · Postpartum depression can be treated. Support groups and counseling can help. Sometimes medicine can also help. Stay healthy  · Eat healthy foods so you have more energy, make good breast milk, and lose extra baby pounds. · If you breastfeed, avoid alcohol and drugs. Stay smoke-free. If you quit during pregnancy, congratulations. · Start daily exercise after 4 to 6 weeks, but rest when you feel tired. · Learn exercises to tone your belly. Do Kegel exercises to regain strength in your pelvic muscles. You can do these exercises while you stand or sit.   ¨ Squeeze the same muscles you would use to stop your urine. Your belly and thighs should not move. ¨ Hold the squeeze for 3 seconds, and then relax for 3 seconds. ¨ Start with 3 seconds. Then add 1 second each week until you are able to squeeze for 10 seconds. ¨ Repeat the exercise 10 to 15 times for each session. Do three or more sessions each day. · Find a class for new mothers and new babies that has an exercise time. · If you had a  birth, give yourself a bit more time before you exercise, and be careful. When should you call for help? Call 911 anytime you think you may need emergency care. For example, call if:  ? · You passed out (lost consciousness). ?Call your doctor now or seek immediate medical care if:  ? · You have severe vaginal bleeding. This means you are passing blood clots and soaking through a pad each hour for 2 or more hours. ? · You are dizzy or lightheaded, or you feel like you may faint. ? · You have a fever. ? · You have new belly pain, or your pain gets worse. ? Watch closely for changes in your health, and be sure to contact your doctor if:  ? · Your vaginal bleeding seems to be getting heavier. ? · You have new or worse vaginal discharge. ? · You feel sad, anxious, or hopeless for more than a few days. ? · You do not get better as expected. Where can you learn more? Go to http://edmar-werner.info/. Enter A461 in the search box to learn more about \"After Your Delivery (the Postpartum Period): Care Instructions. \"  Current as of: 2017  Content Version: 11.4  © 6054-2372 [employer]. Care instructions adapted under license by "[employer]" (which disclaims liability or warranty for this information). If you have questions about a medical condition or this instruction, always ask your healthcare professional. [address] any warranty or liability for your use of this information.

## 2018-01-27 NOTE — LACTATION NOTE
Mom and baby going home today. Met parents in [facility] for assistance with 1100 feeding. Infant unswaddled, demonstrated gentle waking techniques. Infant with good suck on assessment. Assisted with positioning and attempt on right breast in football hold. No latch achieved. Assisted with positioning and attempt on left breast in cross cradle position. Again no latch at this time. Infant uninterested and sleepy at breast. Reviewed signs of good latch, feeding. Encouraged to attempt at the breast first at each feeding, follow with pumping and supplementing as ordered. Feeding plan given with full instructions. Encouraged even if baby latching well, insurance pump due to history of breast reduction and give back pumped colostrum to infant. Hospital rental pump for discharge. Following up in [facility]. Assisted mother with bottle feeding and burping infant. Infant took 30 ml and tolerated well. Per mother, now pumping 1 ml. Reviewed jaundice, weight expectations, and management of engorgement. Mom denies questions. Encouraged to call as needed.

## 2018-01-27 NOTE — LACTATION NOTE
Mom and baby are going home today. Continue to offer the breast without restriction. Mom's milk should be fully in over the next few days. Reviewed engorgement precautions. Hand Expression has been demoed and written hand-out reviewed. As milk comes in baby will be more alert at the breast and swallows will be more obvious. Breasts may feel softer once baby has finished nursing. Baby should be back to birth weight by 3weeks of age. And then gain on average 1 oz per day for the next 2-3 months. Reviewed babies should be exclusively breastfeeding for the first 6 months and that breastfeeding should continue after introduction of appropriate complimentary foods after 6 months. Initial output should be at least 1 wet and 1 bowel movement for each day old baby is. By day 5-7 once milk is fully in baby will consistently have 6 or more soaking wet diapers and about 4 bowel movement. Some babies have a bowel movement with every feeding and some have 1-3 large bowel movements each day. Inadequate output may indicate inadequate feedings and should be reported to your Pediatrician. Bowel habits may change as baby gets older. Encouraged follow-up at Pediatrician in 1-2 days, no later than 1 week of age. Call [facility] for any questions as needed or to set up an OP visit. OP phone calls are returned within 24 hours. Breastfeeding Support Group is offered here monthly. Community Breastfeeding Resource List given.

## 2018-01-27 NOTE — PROGRESS NOTES
Progress Note                               Patient: Anastasiya Ann MRN: [id number]  SSN: [id number]    YOB: 1990  Age: 33 y.o. Sex: female      Postpartum Day Number 1    Subjective:     Patient doing well postpartum without significant complaints. Voiding without difficulty. Occasional headache Patient reports normal lochia. . Breastfeeding: Yes . Baby in SCN, weaned off CPAP today. Objective:     Patient Vitals for the past 18 hrs:   Temp Pulse Resp BP SpO2   18 0651 98 °F (36.7 °C) 74 18 127/81 100 %        Temp (24hrs), Av °F (36.7 °C), Min:98 °F (36.7 °C), Max:98 °F (36.7 °C)      Physical Exam:    General:   Patient without distress. Abdomen: Soft, fundus firm at level of umbilicus, nontender   Lower Extremities: Negative for swelling, cords, or tenderness. Lab/Data Review: All lab results for the last 24 hours reviewed. GBS:   Lab Results   Component Value Date/Time    GrBStrep, External Negative 2018     Blood Type:   Lab Results   Component Value Date/Time    ABO/Rh(D) O POSITIVE 2018 10:13 AM    ABO,Rh O Positive 2017        Assessment and Plan:     Patient appears to be having an uncomplicated postpartum course. Continue routine perineal care and maternal education.     Signed By: Mat Geiger MD     2018

## 2018-01-27 NOTE — DISCHARGE SUMMARY
Obstetrical Discharge Summary     Name: Christi Faust MRN: [id number]  SSN: [id number]    YOB: 1990  Age: 33 y.o. Sex: female      Allergies: Pcn [penicillins]    Admit Date: 2018    Discharge Date: 2018     Admitting Physician: Kristal Correa MD     Attending Physician:  Kristal Correa MD     * Admission Diagnoses: Induction;Normal labor    * Discharge Diagnoses:   Information for the patient's :  Genie Faye [[id number]]   Delivery of a 9 lb 1.2 oz (4.115 kg) female infant via Vaginal, Spontaneous Delivery on 2018 at 12:52 AM  by . Apgars were 5 and 7. Additional Diagnoses:   Hospital Problems as of 2018  Date Reviewed: 2018          Codes Class Noted - Resolved POA    * (Principal)Pre-eclampsia in third trimester ICD-10-CM: O14.93  ICD-9-CM: 642.43  2018 - Present Yes        Polyhydramnios in third trimester ICD-10-CM: O40. 3XX0  ICD-9-CM: 657.03  2018 - Present Yes    Overview Signed 2018 10:22 AM by Morgan Metcalf MD     1st noted 37 wks             Proteinuria affecting pregnancy in third trimester ICD-10-CM: O12.13  ICD-9-CM: 646.23, 791.0  2018 - Present Yes    Overview Signed 2018 11:11 AM by Morgan Metcalf MD     ~600mg             Headache in pregnancy, antepartum, third trimester ICD-10-CM: O26.893, R51  ICD-9-CM: 646.83, 784.0  2017 - Present Yes        Supervision of high risk pregnancy in second trimester ICD-10-CM: O09.92  ICD-9-CM: V23.9  2017 - Present Yes    Overview Addendum 2017 10:14 AM by Kj Burgos     2017 at [facility]:  Pt for consult: positive EIF on ultrasound in OB office. Suspected fetal anomaly, antepartum ICD-10-CM: O35. 9XX0  ICD-9-CM: 655.83  2017 - Present Yes    Overview Addendum 2017  9:04 AM by Morgan Metcalf MD     2017 at [facility]: Sent for 2 EIFs, AFP Tetra Screen Negative on 2017. AC 84%, UA Dopplers WNL.  Normal anatomy and normal heart except EIF in both vents. CL  4.2. Fetal echo done due to echogenic papillary muscle noted on ultrasound that changes the risk of Down's Syndrome from 1/990 to 1/500. Genetic counseling performed, Declines genetic testing. Questionable EDC, please review, EDC based on fetal measurements ahead. I reviewed. We have good early dating. I don't see that anything needs to be changed. · No MFM f/u recommended. First pregnancy ICD-10-CM: Z34.00  ICD-9-CM: V22.0  2017 - Present Yes    Overview Addendum 2017  9:09 AM by Florin Villalta MD     1)hx anxiety/depression. Never found a med that worked well    2)hx anemia. Possibly due to heavy menses  3)prenatal labs nl  4)breast reduction  5)started baby asa and D   Stop asa at 36wks                  Lab Results   Component Value Date/Time    ABO/Rh(D) O POSITIVE 2018 10:13 AM    Rubella, External Immune 2017    GrBStrep, External Negative 2018    ABO,Rh O Positive 2017    There is no immunization history for the selected administration types on file for this patient.     * Procedures:  with second degree laceration and repair  * No surgery found *      [place]  Depression Scale  I have been able to laugh and see the funny side of things: As much as I always could  I have looked forward with enjoyment to things: As much as I ever did  I have blamed myself unnecessarily when things went wrong: Yes, some of the time  I have been anxious or worried for no good reason: Hardly ever  I have felt scared or panicky for no very good reason: No, not much  Things have been getting on top of me: No, most of the time I have coped quite well  I have been so unhappy that I have had difficulty sleeping: No, not at all  I have felt sad or miserable: Yes, quite often  I have been so unhappy that I have been crying: No, never  The thought of harming myself has occurred to me: Never  Total Score: 7    * Discharge Condition: good    [facility] Course: Normal hospital course following the delivery. * Disposition: Home    Discharge Medications:   Current Discharge Medication List      START taking these medications    Details   ibuprofen (MOTRIN) 800 mg tablet Take 1 Tab by mouth every eight (8) hours. Qty: 100 Tab, Refills: 2      oxyCODONE-acetaminophen (PERCOCET 7.5) 7.5-325 mg per tablet Take 1-2 Tabs by mouth every six (6) hours as needed. Max Daily Amount: 8 Tabs. Qty: 30 Tab, Refills: 0    Associated Diagnoses: Postoperative pain         CONTINUE these medications which have NOT CHANGED    Details   polyethylene glycol (MIRALAX) 17 gram packet Take 17 g by mouth daily. cholecalciferol, vitamin D3, (VITAMIN D3) 2,000 unit tab Take  by mouth. PNV 67-iron ps-folate no. 1-dha (VITAFOL ULTRA) 29 mg iron- 1 mg-200 mg cap Take 1 Cap by mouth daily. Qty: 30 Cap, Refills: 11    Associated Diagnoses: Encounter for supervision of normal first pregnancy in first trimester      acetaminophen (TYLENOL) 325 mg tablet Take  by mouth every four (4) hours as needed for Pain. STOP taking these medications       HYDROcodone-acetaminophen (NORCO) 5-325 mg per tablet Comments:   Reason for Stopping:         butalbital-acetaminophen-caffeine (FIORICET, ESGIC) -40 mg per tablet Comments:   Reason for Stopping:               * Follow-up Care/Patient Instructions:   Activity: Activity as tolerated  Diet: Regular Diet  Wound Care: As directed    Follow-up Information     Follow up With Details Comments Contact Info    Jaswant Escalona MD   Patient can only remember the practice name and not the physician             Signed By:  Ada Yarbrough MD     January 27, 2018

## 2018-01-27 NOTE — PROGRESS NOTES
Patient discharged to home  Discharge teaching complete, patient verbalizes understanding; questions encouraged. Prescriptions given. Stable at discharge.

## 2018-01-27 NOTE — PROGRESS NOTES
Progress Note                               Patient: Connie Mei MRN: [id number]  SSN: [id number]    YOB: 1990  Age: 33 y.o. Sex: female      Postpartum Day Number 2    Subjective:     Patient doing well postpartum without significant complaints. Voiding without difficulty. Patient reports normal lochia. . Breastfeeding: formula now    Objective:     Patient Vitals for the past 18 hrs:   Temp Pulse Resp BP SpO2   18 0642 98 °F (36.7 °C) 76 18 136/81 100 %        Temp (24hrs), Av °F (36.7 °C), Min:98 °F (36.7 °C), Max:98 °F (36.7 °C)      Physical Exam:    General:   Patient without distress. Abdomen: Soft, fundus firm at level of umbilicus, nontender   Lower Extremities: Negative for swelling, cords, or tenderness. Lab/Data Review:  CBC:  No results for input(s): WBC, HGB, HGBEXT, HCT, HCTEXT, PLT, PLTEXT, HGBEXT, HCTEXT, PLTEXT in the last 72 hours. Blood Type:   Lab Results   Component Value Date/Time    ABO/Rh(D) O POSITIVE 2018 10:13 AM    ABO,Rh O Positive 2017      Infant's Blood Type: Information for the patient's :  Pavel Zayas [[id number]]     Lab Results   Component Value Date/Time    ABO/Rh(D) O NEGATIVE 2018 12:32 AM       Assessment and Plan: Will discharge home today.     Signed By: Katlyn Villar MD     2018

## 2020-11-05 ENCOUNTER — APPOINTMENT (RX ONLY)
Dept: URBAN - METROPOLITAN AREA CLINIC 24 | Facility: CLINIC | Age: 30
Setting detail: DERMATOLOGY
End: 2020-11-05

## 2020-11-05 DIAGNOSIS — L91.8 OTHER HYPERTROPHIC DISORDERS OF THE SKIN: ICD-10-CM

## 2020-11-05 DIAGNOSIS — D22 MELANOCYTIC NEVI: ICD-10-CM

## 2020-11-05 PROBLEM — D22.61 MELANOCYTIC NEVI OF RIGHT UPPER LIMB, INCLUDING SHOULDER: Status: ACTIVE | Noted: 2020-11-05

## 2020-11-05 PROBLEM — D22.22 MELANOCYTIC NEVI OF LEFT EAR AND EXTERNAL AURICULAR CANAL: Status: ACTIVE | Noted: 2020-11-05

## 2020-11-05 PROBLEM — D22.5 MELANOCYTIC NEVI OF TRUNK: Status: ACTIVE | Noted: 2020-11-05

## 2020-11-05 PROCEDURE — 11310 SHAVE SKIN LESION 0.5 CM/<: CPT

## 2020-11-05 PROCEDURE — ? COUNSELING

## 2020-11-05 PROCEDURE — 99203 OFFICE O/P NEW LOW 30 MIN: CPT | Mod: 25

## 2020-11-05 PROCEDURE — ? SHAVE REMOVAL

## 2020-11-05 PROCEDURE — 11300 SHAVE SKIN LESION 0.5 CM/<: CPT

## 2020-11-05 ASSESSMENT — LOCATION ZONE DERM
LOCATION ZONE: TRUNK
LOCATION ZONE: NECK
LOCATION ZONE: ARM
LOCATION ZONE: EAR

## 2020-11-05 ASSESSMENT — LOCATION SIMPLE DESCRIPTION DERM
LOCATION SIMPLE: RIGHT UPPER BACK
LOCATION SIMPLE: LEFT ANTERIOR NECK
LOCATION SIMPLE: RIGHT ANTERIOR NECK
LOCATION SIMPLE: RIGHT POSTERIOR UPPER ARM
LOCATION SIMPLE: ABDOMEN
LOCATION SIMPLE: LEFT EAR

## 2020-11-05 ASSESSMENT — LOCATION DETAILED DESCRIPTION DERM
LOCATION DETAILED: RIGHT INFERIOR UPPER BACK
LOCATION DETAILED: RIGHT PROXIMAL POSTERIOR UPPER ARM
LOCATION DETAILED: RIGHT INFERIOR LATERAL NECK
LOCATION DETAILED: LEFT INFERIOR LATERAL NECK
LOCATION DETAILED: LEFT LATERAL ABDOMEN
LOCATION DETAILED: LEFT SUPERIOR HELIX
LOCATION DETAILED: EPIGASTRIC SKIN

## 2020-11-05 NOTE — PROCEDURE: SHAVE REMOVAL
X Size Of Lesion In Cm (Optional): 0
Wound Care: Petrolatum
Detail Level: Detailed
Anesthesia Volume In Cc: 0.5
Path Notes (To The Dermatopathologist): Please check margins.
Hemostasis: Aluminum Chloride
Bill 95519 For Specimen Handling/Conveyance To Laboratory?: no
Was A Bandage Applied: Yes
Medical Necessity Information: It is in your best interest to select a reason for this procedure from the list below. All of these items fulfill various CMS LCD requirements except the new and changing color options.
Post-Care Instructions: I reviewed with the patient in detail post-care instructions. Patient is to keep the biopsy site dry overnight, and then apply bacitracin twice daily until healed. Patient may apply hydrogen peroxide soaks to remove any crusting.
Size Of Lesion In Cm (Required): 0.4
Notification Instructions: Patient will be notified of biopsy results. However, patient instructed to call the office if not contacted within 2 weeks.
Accession #: [id number]
Biopsy Method: double edge Personna blade
Consent: Verbal consent was obtained from the patient. The risks and benefits to therapy were discussed in detail. Specifically, the risks of infection, scarring, bleeding, prolonged wound healing, incomplete removal, allergy to anesthesia, nerve injury and recurrence were addressed. Prior to the procedure, the treatment site was clearly identified and confirmed by the patient. All components of Universal Protocol/PAUSE Rule completed. CHESTER Jones.
Medical Necessity Clause: This procedure was medically necessary because the lesion is
Size Of Lesion In Cm (Required): 0.3
Medical Necessity Clause: This procedure was medically necessary because the lesion is enlarging
Billing Type: Third-Party Bill
Consent: Verbal consent was obtained from the patient. The risks and benefits to therapy were discussed in detail. Specifically, the risks of infection, scarring, bleeding, prolonged wound healing, incomplete removal, allergy to anesthesia, nerve injury and recurrence were addressed. Prior to the procedure, the treatment site was clearly identified and confirmed by the patient. All components of Universal Protocol/PAUSE Rule completed. ARLETTE Jones, TANESHA
Anesthesia Type: 1% lidocaine without epinephrine and a 1:12 solution of 8.4% sodium bicarbonate

## 2020-12-21 ENCOUNTER — APPOINTMENT (RX ONLY)
Dept: URBAN - METROPOLITAN AREA CLINIC 24 | Facility: CLINIC | Age: 30
Setting detail: DERMATOLOGY
End: 2020-12-21

## 2020-12-21 DIAGNOSIS — D22 MELANOCYTIC NEVI: ICD-10-CM

## 2020-12-21 PROBLEM — D22.61 MELANOCYTIC NEVI OF RIGHT UPPER LIMB, INCLUDING SHOULDER: Status: ACTIVE | Noted: 2020-12-21

## 2020-12-21 PROCEDURE — 12031 INTMD RPR S/A/T/EXT 2.5 CM/<: CPT

## 2020-12-21 PROCEDURE — ? PUNCH EXCISION

## 2020-12-21 PROCEDURE — 11401 EXC TR-EXT B9+MARG 0.6-1 CM: CPT

## 2020-12-21 PROCEDURE — A4550 SURGICAL TRAYS: HCPCS

## 2020-12-21 ASSESSMENT — LOCATION SIMPLE DESCRIPTION DERM: LOCATION SIMPLE: RIGHT POSTERIOR UPPER ARM

## 2020-12-21 ASSESSMENT — LOCATION DETAILED DESCRIPTION DERM: LOCATION DETAILED: RIGHT PROXIMAL POSTERIOR UPPER ARM

## 2020-12-21 ASSESSMENT — LOCATION ZONE DERM: LOCATION ZONE: ARM

## 2021-11-04 ENCOUNTER — APPOINTMENT (RX ONLY)
Dept: URBAN - METROPOLITAN AREA CLINIC 24 | Facility: CLINIC | Age: 31
Setting detail: DERMATOLOGY
End: 2021-11-04

## 2021-11-04 DIAGNOSIS — Z71.89 OTHER SPECIFIED COUNSELING: ICD-10-CM

## 2021-11-04 DIAGNOSIS — D22 MELANOCYTIC NEVI: ICD-10-CM

## 2021-11-04 DIAGNOSIS — L70.0 ACNE VULGARIS: ICD-10-CM | Status: INADEQUATELY CONTROLLED

## 2021-11-04 DIAGNOSIS — L57.8 OTHER SKIN CHANGES DUE TO CHRONIC EXPOSURE TO NONIONIZING RADIATION: ICD-10-CM

## 2021-11-04 DIAGNOSIS — Z87.2 PERSONAL HISTORY OF DISEASES OF THE SKIN AND SUBCUTANEOUS TISSUE: ICD-10-CM

## 2021-11-04 PROBLEM — D22.71 MELANOCYTIC NEVI OF RIGHT LOWER LIMB, INCLUDING HIP: Status: ACTIVE | Noted: 2021-11-04

## 2021-11-04 PROBLEM — D22.5 MELANOCYTIC NEVI OF TRUNK: Status: ACTIVE | Noted: 2021-11-04

## 2021-11-04 PROCEDURE — ? COUNSELING

## 2021-11-04 PROCEDURE — 99214 OFFICE O/P EST MOD 30 MIN: CPT | Mod: 25

## 2021-11-04 PROCEDURE — ? SUNSCREEN RECOMMENDATIONS

## 2021-11-04 PROCEDURE — ? PRESCRIPTION MEDICATION MANAGEMENT

## 2021-11-04 PROCEDURE — 11300 SHAVE SKIN LESION 0.5 CM/<: CPT

## 2021-11-04 PROCEDURE — ? SHAVE REMOVAL

## 2021-11-04 PROCEDURE — ? PRESCRIPTION

## 2021-11-04 RX ORDER — SPIRONOLACTONE 50 MG/1
TABLET, FILM COATED ORAL
Qty: 30 | Refills: 2 | Status: ERX

## 2021-11-04 RX ORDER — ADAPALENE 3 MG/G
GEL TOPICAL
Qty: 45 | Refills: 2 | Status: ERX | COMMUNITY
Start: 2021-11-04

## 2021-11-04 RX ADMIN — ADAPALENE: 3 GEL TOPICAL at 00:00

## 2021-11-04 ASSESSMENT — LOCATION DETAILED DESCRIPTION DERM
LOCATION DETAILED: LEFT POSTERIOR SHOULDER
LOCATION DETAILED: LEFT LATERAL ABDOMEN
LOCATION DETAILED: RIGHT CHIN
LOCATION DETAILED: RIGHT INFERIOR UPPER BACK
LOCATION DETAILED: RIGHT LATERAL BUCCAL CHEEK
LOCATION DETAILED: RIGHT POSTERIOR SHOULDER
LOCATION DETAILED: RIGHT PROXIMAL POSTERIOR UPPER ARM
LOCATION DETAILED: EPIGASTRIC SKIN
LOCATION DETAILED: RIGHT ANTERIOR DISTAL THIGH
LOCATION DETAILED: LEFT LATERAL BUCCAL CHEEK

## 2021-11-04 ASSESSMENT — LOCATION ZONE DERM
LOCATION ZONE: TRUNK
LOCATION ZONE: FACE
LOCATION ZONE: ARM
LOCATION ZONE: LEG

## 2021-11-04 ASSESSMENT — LOCATION SIMPLE DESCRIPTION DERM
LOCATION SIMPLE: RIGHT CHEEK
LOCATION SIMPLE: CHIN
LOCATION SIMPLE: ABDOMEN
LOCATION SIMPLE: RIGHT THIGH
LOCATION SIMPLE: RIGHT SHOULDER
LOCATION SIMPLE: RIGHT UPPER BACK
LOCATION SIMPLE: LEFT CHEEK
LOCATION SIMPLE: LEFT SHOULDER
LOCATION SIMPLE: RIGHT POSTERIOR UPPER ARM

## 2021-11-04 NOTE — PROCEDURE: PRESCRIPTION MEDICATION MANAGEMENT
Detail Level: Simple
Initiate Treatment: spironolactone, adapalene a couple of times a week and may increase if tolerated
Render In Strict Bullet Format?: No

## 2021-11-04 NOTE — PROCEDURE: SHAVE REMOVAL
Size Of Lesion In Cm (Required): 0.2
Hemostasis: Drysol
Render Post-Care Instructions In Note?: no
Biopsy Method: Personna blade
Detail Level: Detailed
Medical Necessity Information: It is in your best interest to select a reason for this procedure from the list below. All of these items fulfill various CMS LCD requirements except the new and changing color options.
Consent: Verbal Consent was obtained from the patient. The risks and benefits to therapy were discussed in detail. Specifically, the risks of infection, scarring, bleeding, prolonged wound healing, incomplete removal, allergy to anesthesia, nerve injury and recurrence were addressed. Prior to the procedure, the treatment site was clearly identified and confirmed by the patient. All components of Universal Protocol/PAUSE Rule completed.
Accession #: [id number]
Anesthesia Type: 1% lidocaine without epinephrine and a 1:12 solution of 8.4% sodium bicarbonate
Notification Instructions: Patient will be notified of pathology results. However, patient instructed to call the office if not contacted within 2 weeks.
Medical Necessity Clause: This procedure was medically necessary because the lesion that was treated was:
Wound Care: Petrolatum
Post-Care Instructions: I reviewed with the patient in detail post-care instructions. Patient is to keep the biopsy site dry overnight, and then apply bacitracin twice daily until healed. Patient may apply hydrogen peroxide soaks to remove any crusting.
X Size Of Lesion In Cm (Optional): 0
Billing Type: Third-Party Bill
Was A Bandage Applied: Yes

## 2021-11-04 NOTE — PROCEDURE: COUNSELING
Tetracycline Counseling: Patient counseled regarding possible photosensitivity and increased risk for sunburn.  Patient instructed to avoid sunlight, if possible.  When exposed to sunlight, patients should wear protective clothing, sunglasses, and sunscreen.  The patient was instructed to call the office immediately if the following severe adverse effects occur:  hearing changes, easy bruising/bleeding, severe headache, or vision changes.  The patient verbalized understanding of the proper use and possible adverse effects of tetracycline.  All of the patient's questions and concerns were addressed. Patient understands to avoid pregnancy while on therapy due to potential birth defects.
Topical Clindamycin Counseling: Patient counseled that this medication may cause skin irritation or allergic reactions.  In the event of skin irritation, the patient was advised to reduce the amount of the drug applied or use it less frequently.   The patient verbalized understanding of the proper use and possible adverse effects of clindamycin.  All of the patient's questions and concerns were addressed.
High Dose Vitamin A Counseling: Side effects reviewed, pt to contact office should one occur.
Topical Retinoid counseling:  Patient advised to apply a pea-sized amount only at bedtime and wait 30 minutes after washing their face before applying.  If too drying, patient may add a non-comedogenic moisturizer. The patient verbalized understanding of the proper use and possible adverse effects of retinoids.  All of the patient's questions and concerns were addressed.
Topical Sulfur Applications Counseling: Topical Sulfur Counseling: Patient counseled that this medication may cause skin irritation or allergic reactions.  In the event of skin irritation, the patient was advised to reduce the amount of the drug applied or use it less frequently.   The patient verbalized understanding of the proper use and possible adverse effects of topical sulfur application.  All of the patient's questions and concerns were addressed.
Tetracycline Pregnancy And Lactation Text: This medication is Pregnancy Category D and not consider safe during pregnancy. It is also excreted in breast milk.
Dapsone Counseling: I discussed with the patient the risks of dapsone including but not limited to hemolytic anemia, agranulocytosis, rashes, methemoglobinemia, kidney failure, peripheral neuropathy, headaches, GI upset, and liver toxicity.  Patients who start dapsone require monitoring including baseline LFTs and weekly CBCs for the first month, then every month thereafter.  The patient verbalized understanding of the proper use and possible adverse effects of dapsone.  All of the patient's questions and concerns were addressed.
Detail Level: Zone
Bactrim Pregnancy And Lactation Text: This medication is Pregnancy Category D and is known to cause fetal risk.  It is also excreted in breast milk.
Birth Control Pills Pregnancy And Lactation Text: This medication should be avoided if pregnant and for the first 30 days post-partum.
Isotretinoin Counseling: Patient should get monthly blood tests, not donate blood, not drive at night if vision affected, not share medication, and not undergo elective surgery for 6 months after tx completed. Side effects reviewed, pt to contact office should one occur.
Use Enhanced Medication Counseling?: No
Sunscreen Recommendations: Broad Spectrum
Benzoyl Peroxide Pregnancy And Lactation Text: This medication is Pregnancy Category C. It is unknown if benzoyl peroxide is excreted in breast milk.
High Dose Vitamin A Pregnancy And Lactation Text: High dose vitamin A therapy is contraindicated during pregnancy and breast feeding.
Doxycycline Pregnancy And Lactation Text: This medication is Pregnancy Category D and not consider safe during pregnancy. It is also excreted in breast milk but is considered safe for shorter treatment courses.
Tazorac Counseling:  Patient advised that medication is irritating and drying.  Patient may need to apply sparingly and wash off after an hour before eventually leaving it on overnight.  The patient verbalized understanding of the proper use and possible adverse effects of tazorac.  All of the patient's questions and concerns were addressed.
Doxycycline Counseling:  Patient counseled regarding possible photosensitivity and increased risk for sunburn.  Patient instructed to avoid sunlight, if possible.  When exposed to sunlight, patients should wear protective clothing, sunglasses, and sunscreen.  The patient was instructed to call the office immediately if the following severe adverse effects occur:  hearing changes, easy bruising/bleeding, severe headache, or vision changes.  The patient verbalized understanding of the proper use and possible adverse effects of doxycycline.  All of the patient's questions and concerns were addressed.
Minocycline Counseling: Patient advised regarding possible photosensitivity and discoloration of the teeth, skin, lips, tongue and gums.  Patient instructed to avoid sunlight, if possible.  When exposed to sunlight, patients should wear protective clothing, sunglasses, and sunscreen.  The patient was instructed to call the office immediately if the following severe adverse effects occur:  hearing changes, easy bruising/bleeding, severe headache, or vision changes.  The patient verbalized understanding of the proper use and possible adverse effects of minocycline.  All of the patient's questions and concerns were addressed.
Topical Clindamycin Pregnancy And Lactation Text: This medication is Pregnancy Category B and is considered safe during pregnancy. It is unknown if it is excreted in breast milk.
Tazorac Pregnancy And Lactation Text: This medication is not safe during pregnancy. It is unknown if this medication is excreted in breast milk.
Benzoyl Peroxide Counseling: Patient counseled that medicine may cause skin irritation and bleach clothing.  In the event of skin irritation, the patient was advised to reduce the amount of the drug applied or use it less frequently.   The patient verbalized understanding of the proper use and possible adverse effects of benzoyl peroxide.  All of the patient's questions and concerns were addressed.
Erythromycin Pregnancy And Lactation Text: This medication is Pregnancy Category B and is considered safe during pregnancy. It is also excreted in breast milk.
Topical Sulfur Applications Pregnancy And Lactation Text: This medication is Pregnancy Category C and has an unknown safety profile during pregnancy. It is unknown if this topical medication is excreted in breast milk.
Sarecycline Counseling: Patient advised regarding possible photosensitivity and discoloration of the teeth, skin, lips, tongue and gums.  Patient instructed to avoid sunlight, if possible.  When exposed to sunlight, patients should wear protective clothing, sunglasses, and sunscreen.  The patient was instructed to call the office immediately if the following severe adverse effects occur:  hearing changes, easy bruising/bleeding, severe headache, or vision changes.  The patient verbalized understanding of the proper use and possible adverse effects of sarecycline.  All of the patient's questions and concerns were addressed.
Topical Retinoid Pregnancy And Lactation Text: This medication is Pregnancy Category C. It is unknown if this medication is excreted in breast milk.
Spironolactone Pregnancy And Lactation Text: This medication can cause feminization of the male fetus and should be avoided during pregnancy. The active metabolite is also found in breast milk.
Spironolactone Counseling: Patient advised regarding risks of diarrhea, abdominal pain, hyperkalemia, birth defects (for female patients), liver toxicity and renal toxicity. The patient may need blood work to monitor liver and kidney function and potassium levels while on therapy. The patient verbalized understanding of the proper use and possible adverse effects of spironolactone.  All of the patient's questions and concerns were addressed.
Birth Control Pills Counseling: Birth Control Pill Counseling: I discussed with the patient the potential side effects of OCPs including but not limited to increased risk of stroke, heart attack, thrombophlebitis, deep venous thrombosis, hepatic adenomas, breast changes, GI upset, headaches, and depression.  The patient verbalized understanding of the proper use and possible adverse effects of OCPs. All of the patient's questions and concerns were addressed.
Azithromycin Pregnancy And Lactation Text: This medication is considered safe during pregnancy and is also secreted in breast milk.
Bactrim Counseling:  I discussed with the patient the risks of sulfa antibiotics including but not limited to GI upset, allergic reaction, drug rash, diarrhea, dizziness, photosensitivity, and yeast infections.  Rarely, more serious reactions can occur including but not limited to aplastic anemia, agranulocytosis, methemoglobinemia, blood dyscrasias, liver or kidney failure, lung infiltrates or desquamative/blistering drug rashes.
Isotretinoin Pregnancy And Lactation Text: This medication is Pregnancy Category X and is considered extremely dangerous during pregnancy. It is unknown if it is excreted in breast milk.
Dapsone Pregnancy And Lactation Text: This medication is Pregnancy Category C and is not considered safe during pregnancy or breast feeding.
Azithromycin Counseling:  I discussed with the patient the risks of azithromycin including but not limited to GI upset, allergic reaction, drug rash, diarrhea, and yeast infections.
Erythromycin Counseling:  I discussed with the patient the risks of erythromycin including but not limited to GI upset, allergic reaction, drug rash, diarrhea, increase in liver enzymes, and yeast infections.

## 2021-11-05 ENCOUNTER — APPOINTMENT (RX ONLY)
Dept: URBAN - METROPOLITAN AREA CLINIC 23 | Facility: CLINIC | Age: 31
Setting detail: DERMATOLOGY
End: 2021-11-05

## 2022-02-17 ENCOUNTER — RX ONLY (OUTPATIENT)
Age: 32
Setting detail: RX ONLY
End: 2022-02-17

## 2022-02-17 RX ORDER — SPIRONOLACTONE 50 MG/1
TABLET, FILM COATED ORAL
Qty: 90 | Refills: 2 | Status: ERX

## 2022-03-19 PROBLEM — O40.3XX0 POLYHYDRAMNIOS IN THIRD TRIMESTER: Status: ACTIVE | Noted: 2018-01-11

## 2022-03-19 PROBLEM — O14.93 PRE-ECLAMPSIA IN THIRD TRIMESTER: Status: ACTIVE | Noted: 2018-01-22

## 2022-03-19 PROBLEM — O09.92 SUPERVISION OF HIGH RISK PREGNANCY IN SECOND TRIMESTER: Status: ACTIVE | Noted: 2017-09-19

## 2022-03-19 PROBLEM — O12.13 PROTEINURIA AFFECTING PREGNANCY IN THIRD TRIMESTER: Status: ACTIVE | Noted: 2018-01-06

## 2022-03-19 PROBLEM — O35.9XX0 SUSPECTED FETAL ANOMALY, ANTEPARTUM: Status: ACTIVE | Noted: 2017-09-19

## 2022-03-20 PROBLEM — Z34.00 FIRST PREGNANCY: Status: ACTIVE | Noted: 2017-06-20

## 2022-03-20 PROBLEM — O26.893 HEADACHE IN PREGNANCY, ANTEPARTUM, THIRD TRIMESTER: Status: ACTIVE | Noted: 2017-12-28

## 2022-03-20 PROBLEM — R51.9 HEADACHE IN PREGNANCY, ANTEPARTUM, THIRD TRIMESTER: Status: ACTIVE | Noted: 2017-12-28

## 2022-05-20 PROBLEM — R07.89 ATYPICAL CHEST PAIN: Status: ACTIVE | Noted: 2022-05-20

## 2022-05-20 PROBLEM — E66.3 OVERWEIGHT (BMI 25.0-29.9): Status: ACTIVE | Noted: 2022-05-20

## 2022-05-20 PROBLEM — I49.3 SYMPTOMATIC PVCS: Status: ACTIVE | Noted: 2022-05-20

## 2022-11-03 ENCOUNTER — APPOINTMENT (RX ONLY)
Dept: URBAN - METROPOLITAN AREA CLINIC 24 | Facility: CLINIC | Age: 32
Setting detail: DERMATOLOGY
End: 2022-11-03

## 2022-11-03 DIAGNOSIS — L91.8 OTHER HYPERTROPHIC DISORDERS OF THE SKIN: ICD-10-CM

## 2022-11-03 DIAGNOSIS — D22 MELANOCYTIC NEVI: ICD-10-CM

## 2022-11-03 DIAGNOSIS — L57.8 OTHER SKIN CHANGES DUE TO CHRONIC EXPOSURE TO NONIONIZING RADIATION: ICD-10-CM

## 2022-11-03 DIAGNOSIS — Z87.2 PERSONAL HISTORY OF DISEASES OF THE SKIN AND SUBCUTANEOUS TISSUE: ICD-10-CM

## 2022-11-03 PROBLEM — D22.5 MELANOCYTIC NEVI OF TRUNK: Status: ACTIVE | Noted: 2022-11-03

## 2022-11-03 PROCEDURE — ? DEFER

## 2022-11-03 PROCEDURE — ? SUNSCREEN RECOMMENDATIONS

## 2022-11-03 PROCEDURE — ? COUNSELING

## 2022-11-03 PROCEDURE — 99213 OFFICE O/P EST LOW 20 MIN: CPT

## 2022-11-03 ASSESSMENT — LOCATION SIMPLE DESCRIPTION DERM
LOCATION SIMPLE: RIGHT UPPER BACK
LOCATION SIMPLE: RIGHT THIGH
LOCATION SIMPLE: RIGHT POSTERIOR UPPER ARM
LOCATION SIMPLE: ABDOMEN
LOCATION SIMPLE: RIGHT ANTERIOR NECK
LOCATION SIMPLE: LEFT ANTERIOR NECK

## 2022-11-03 ASSESSMENT — LOCATION ZONE DERM
LOCATION ZONE: ARM
LOCATION ZONE: TRUNK
LOCATION ZONE: LEG
LOCATION ZONE: NECK

## 2022-11-03 ASSESSMENT — LOCATION DETAILED DESCRIPTION DERM
LOCATION DETAILED: LEFT INFERIOR ANTERIOR NECK
LOCATION DETAILED: RIGHT PROXIMAL POSTERIOR UPPER ARM
LOCATION DETAILED: EPIGASTRIC SKIN
LOCATION DETAILED: RIGHT SUPERIOR MEDIAL UPPER BACK
LOCATION DETAILED: RIGHT INFERIOR UPPER BACK
LOCATION DETAILED: LEFT LATERAL ABDOMEN
LOCATION DETAILED: RIGHT ANTERIOR DISTAL THIGH
LOCATION DETAILED: RIGHT INFERIOR LATERAL NECK

## 2022-11-03 NOTE — PROCEDURE: DEFER
Procedure To Be Performed At Next Visit: Skin Tag removal (Cosmetic)
Size Of Lesion In Cm (Optional): 0
Detail Level: Detailed
Spontaneous, unlabored and symmetrical
Introduction Text (Please End With A Colon): The following procedure was deferred:

## 2022-12-27 ENCOUNTER — HOSPITAL ENCOUNTER (EMERGENCY)
Age: 32
Discharge: HOME | End: 2022-12-27
Attending: EMERGENCY MEDICINE
Payer: COMMERCIAL

## 2022-12-27 VITALS
OXYGEN SATURATION: 98 % | TEMPERATURE: 98.6 F | SYSTOLIC BLOOD PRESSURE: 106 MMHG | BODY MASS INDEX: 31.37 KG/M2 | WEIGHT: 207 LBS | DIASTOLIC BLOOD PRESSURE: 61 MMHG | RESPIRATION RATE: 14 BRPM | HEIGHT: 68 IN | HEART RATE: 77 BPM

## 2022-12-27 DIAGNOSIS — N93.9 VAGINAL BLEEDING: Primary | ICD-10-CM

## 2022-12-27 LAB
ALBUMIN SERPL-MCNC: 3.7 G/DL (ref 3.5–5)
ALBUMIN/GLOB SERPL: 1.1 {RATIO} (ref 0.4–1.6)
ALP SERPL-CCNC: 54 U/L (ref 50–130)
ALT SERPL-CCNC: 39 U/L (ref 12–65)
ANION GAP SERPL CALC-SCNC: 6 MMOL/L (ref 2–11)
APPEARANCE UR: CLEAR
AST SERPL-CCNC: 22 U/L (ref 15–37)
BACTERIA URNS QL MICRO: NEGATIVE /HPF
BASOPHILS # BLD: 0 K/UL (ref 0–0.2)
BASOPHILS NFR BLD: 0 % (ref 0–2)
BILIRUB SERPL-MCNC: 0.2 MG/DL (ref 0.2–1.1)
BILIRUB UR QL: NEGATIVE
BUN SERPL-MCNC: 10 MG/DL (ref 6–23)
CALCIUM SERPL-MCNC: 8.8 MG/DL (ref 8.3–10.4)
CASTS URNS QL MICRO: ABNORMAL /LPF
CHLORIDE SERPL-SCNC: 106 MMOL/L (ref 101–110)
CO2 SERPL-SCNC: 28 MMOL/L (ref 21–32)
COLOR UR: ABNORMAL
CREAT SERPL-MCNC: 0.64 MG/DL (ref 0.6–1)
DIFFERENTIAL METHOD BLD: ABNORMAL
EOSINOPHIL # BLD: 0.1 K/UL (ref 0–0.8)
EOSINOPHIL NFR BLD: 1 % (ref 0.5–7.8)
EPI CELLS #/AREA URNS HPF: ABNORMAL /HPF
ERYTHROCYTE [DISTWIDTH] IN BLOOD BY AUTOMATED COUNT: 12 % (ref 11.9–14.6)
GLOBULIN SER CALC-MCNC: 3.3 G/DL (ref 2.8–4.5)
GLUCOSE SERPL-MCNC: 102 MG/DL (ref 65–100)
GLUCOSE UR STRIP.AUTO-MCNC: NEGATIVE MG/DL
HCG UR QL: NEGATIVE
HCT VFR BLD AUTO: 35.9 % (ref 35.8–46.3)
HGB BLD-MCNC: 12.2 G/DL (ref 11.7–15.4)
HGB UR QL STRIP: ABNORMAL
IMM GRANULOCYTES # BLD AUTO: 0 K/UL (ref 0–0.5)
IMM GRANULOCYTES NFR BLD AUTO: 0 % (ref 0–5)
KETONES UR QL STRIP.AUTO: NEGATIVE MG/DL
LEUKOCYTE ESTERASE UR QL STRIP.AUTO: NEGATIVE
LIPASE SERPL-CCNC: 168 U/L (ref 73–393)
LYMPHOCYTES # BLD: 2.7 K/UL (ref 0.5–4.6)
LYMPHOCYTES NFR BLD: 27 % (ref 13–44)
MCH RBC QN AUTO: 31.4 PG (ref 26.1–32.9)
MCHC RBC AUTO-ENTMCNC: 34 G/DL (ref 31.4–35)
MCV RBC AUTO: 92.3 FL (ref 82–102)
MONOCYTES # BLD: 0.4 K/UL (ref 0.1–1.3)
MONOCYTES NFR BLD: 4 % (ref 4–12)
NEUTS SEG # BLD: 6.8 K/UL (ref 1.7–8.2)
NEUTS SEG NFR BLD: 68 % (ref 43–78)
NITRITE UR QL STRIP.AUTO: NEGATIVE
NRBC # BLD: 0 K/UL (ref 0–0.2)
PH UR STRIP: 7.5 [PH] (ref 5–9)
PLATELET # BLD AUTO: 230 K/UL (ref 150–450)
PMV BLD AUTO: 9.2 FL (ref 9.4–12.3)
POTASSIUM SERPL-SCNC: 3.9 MMOL/L (ref 3.5–5.1)
PROT SERPL-MCNC: 7 G/DL (ref 6.3–8.2)
PROT UR STRIP-MCNC: NEGATIVE MG/DL
RBC # BLD AUTO: 3.89 M/UL (ref 4.05–5.2)
RBC #/AREA URNS HPF: >100 /HPF
SODIUM SERPL-SCNC: 140 MMOL/L (ref 133–143)
SP GR UR REFRACTOMETRY: 1.01 (ref 1–1.02)
UROBILINOGEN UR QL STRIP.AUTO: 0.2 EU/DL (ref 0.2–1)
WBC # BLD AUTO: 10 K/UL (ref 4.3–11.1)
WBC URNS QL MICRO: ABNORMAL /HPF

## 2022-12-27 PROCEDURE — 85025 COMPLETE CBC W/AUTO DIFF WBC: CPT

## 2022-12-27 PROCEDURE — 80053 COMPREHEN METABOLIC PANEL: CPT

## 2022-12-27 PROCEDURE — 81025 URINE PREGNANCY TEST: CPT

## 2022-12-27 PROCEDURE — 81001 URINALYSIS AUTO W/SCOPE: CPT

## 2022-12-27 PROCEDURE — 99283 EMERGENCY DEPT VISIT LOW MDM: CPT

## 2022-12-27 PROCEDURE — 83690 ASSAY OF LIPASE: CPT

## 2022-12-27 ASSESSMENT — PAIN DESCRIPTION - LOCATION: LOCATION: ABDOMEN

## 2022-12-27 ASSESSMENT — PAIN - FUNCTIONAL ASSESSMENT
PAIN_FUNCTIONAL_ASSESSMENT: ACTIVITIES ARE NOT PREVENTED
PAIN_FUNCTIONAL_ASSESSMENT: 0-10

## 2022-12-27 ASSESSMENT — PAIN SCALES - GENERAL: PAINLEVEL_OUTOF10: 4

## 2022-12-27 ASSESSMENT — PAIN DESCRIPTION - PAIN TYPE: TYPE: ACUTE PAIN

## 2022-12-27 ASSESSMENT — PAIN DESCRIPTION - ONSET: ONSET: SUDDEN

## 2022-12-27 ASSESSMENT — PAIN DESCRIPTION - FREQUENCY: FREQUENCY: CONTINUOUS

## 2022-12-27 ASSESSMENT — PAIN DESCRIPTION - DESCRIPTORS: DESCRIPTORS: CRAMPING;DISCOMFORT

## 2022-12-27 NOTE — ED TRIAGE NOTES
Pt states she had IUD removed on November 10th and started experiencing a heavy vaginal bleed on 12/25/2022. Unsure of pregnancy.

## 2022-12-28 NOTE — DISCHARGE INSTRUCTIONS
Take medications as prescribed. Follow-up with recommended provider in the next 1-2 days. Return to the ED immediately for any new, worsening, concerning symptoms; or for danger signs as discussed. Return to the ED immediately if you experience worsening bleeding, pain, dizziness, lightheadedness, chest pain, shortness of breath, or any other concern.

## 2022-12-28 NOTE — ED PROVIDER NOTES
Emergency Department Provider Note                   PCP:                Jose A Alicea, APRN - NP               Age: 33 y.o. Sex: female       ICD-10-CM    1. Vaginal bleeding  N93.9           DISPOSITION         MDM  As in HPI. Pleasant well-appearing 61-year-old female in ED on advisement of her gynecologist to get checked for heavy vaginal bleeding x2 days. Does not suspect pregnancy, is  does not suspect UTI. No other urinary complaint, no other pain complaints besides \"period cramps. \"  No recent illness or injury. States historically had heavy menstrual bleeding with her periods, has not had a menstrual period in over 2 years due to IUD, but this was removed last month and states she began experiencing her first menstrual period 2 days ago. Large blood noted in urine, but results not indicative of infection. Negative urine hCG. Labs are obtained and reassuring, normal H&H. Patient's abdomen is soft nontender on serial exam.  I recommended GYN exam, which patient is agreeable to. Patient declined recommended STI testing, denying concern for this, reports in monogamous relationship with . Chaperoned exam with Savana female RN. There is no visualized lesion, foreign body, laceration. There is small amount of vaginal bleeding, normal-appearing cervix, no abnormal discharge. No tenderness on exam.  Feel patient is stable for discharge home at this time. We have discussed additional work-up and management, prescriptions for OCP to assist, patient declines these. States that she presents to the ED wanting her H&H checked and only on the recommendation of her gynecologist.  States he is close follow-up will call in morning to schedule as possible follow-up with GYN. Have low clinical suspicion of STI, PID, TOA, ectopic pregnancy, pyelonephritis, cystitis, ureteral stone, other acute emergent process. Feel stable for discharge home. Answered all questions.   Again offered additional management, OCPs which patient declines. Given strict return precautions advised to return if she experiences worsening bleeding, any pain, fever chills, urinary complaints, dizziness, lightheadedness, nausea or vomiting, chest pain difficulty breathing or other concerning feature. Patient is well-hydrated appearing, no distress. Nontoxic-appearing, tolerating oral intake, hemodynamically stable. All findings and plan were discussed with the patient. All questions answered. Discussed with the patient that an unremarkable evaluation in the ED does not preclude the development or presence of a serious or life threatening condition. Patient was instructed to return immediately for any worsening or change in current symptoms, or if symptoms do not continue to improve. I instructed them to follow up with their primary care provider, own specialist, or medical provider that I am recommending for him within the next 2-3 days  The patient acknowledged understanding plan of care and affirmed approval.     Signed by: BUBBA Arce     This note created using Dragon voice recognition software. Please excuse any accidental errors associated with its use, as note has not been fully proofread and edited. Orders Placed This Encounter   Procedures    Urinalysis    Urine Preg (Lab)    CBC with Auto Differential    CMP    Lipase    PELVIC EXAM SET UP    Saline lock IV        Medications - No data to display    New Prescriptions    No medications on file        Miguel Hamilton is a 33 y.o. female who presents to the Emergency Department with chief complaint of    Chief Complaint   Patient presents with    Abdominal Pain    Vaginal Bleeding     IUD was placed and taken out on November 10th and pt started experiencing heavy vaginal bleeding 12/25/2022. Unsure of pregnancy. HPI  Pleasant 45-year-old female presents to the emergency department with her father for evaluation of vaginal bleeding. States she had her IUD removed in early November, states that 2 days ago she began experiencing her first menstrual period that she has had within the last 2 years. States that she has lower abdominal cramping that is consistent with her menstrual cramping, but is experiencing quite heavy menstrual bleeding. States that she typically has always had quite heavy menstrual periods. Is saturating 1 pad per hour. States that she called her gynecologist to discuss and advised to come to the ED for evaluation. Denies dysuria, increased urinary frequency or urgency, difficulty urinating other urinary complaint, back pain, flank pain, other abdominal pain, nausea or vomiting, diarrhea, fever chills, unplanned weight loss, chest pain, difficulty breathing and all other complaint. All other systems reviewed and are negative unless otherwise stated in the history of present illness section. Review of Systems  Constitutional: Negative for fever. Negative for appetite change, chills, diaphoresis and unexpected weight change. HENT: Negative     Eyes: Negative   Respiratory: Negative  Cardiovascular: Negative  GI/: As in HPI  Musculoskeletal: Negative   Skin: Negative     Allergic/Immunologic: Negative  Neurological: Negative                        No past medical history on file. No past surgical history on file. No family history on file. Social History     Socioeconomic History    Marital status:         Allergies: Amoxicillin, Augmentin [amoxicillin-pot clavulanate], and Penicillins    Previous Medications    No medications on file        Vitals signs and nursing note reviewed. Patient Vitals for the past 4 hrs:   Temp Pulse Resp BP SpO2   12/27/22 1833 98.6 °F (37 °C) 80 16 119/81 100 %          Physical Exam   Constitutional: Oriented to person, place, and time. Appears well-developed and well-nourished. No distress.     HENT:    Head: Normocephalic and atraumatic   Right Ear: External ear normal.    Left Ear: External ear normal.     Nose: Nose normal.   Mouth/Throat: Mouth normal.    Eyes: Conjunctivae are normal.   Neck: Supple. No tracheal deviation. Cardiovascular: Normal rate, intact distal pulses. Brisk capillary refill intact, less than 2 seconds. Regular rhythm present. No pitting edema. Pulmonary/Chest: Lungs are clear & equal bilaterally. No adventitious sounds. No respiratory distress. Abdominal: Soft. There is no focal tenderness, guarding, rebound, rigidity. No flank or CVA tenderness. Musculoskeletal: No obvious deformity, erythema, edema. Neurological: Alert and oriented to person, place, and time. No numbness/tingling. No loss of sensation. Positive PMS ×4. GCS= 15. Skin: Skin is warm and dry. Capillary refill takes less than 2 seconds. No abrasion, no lesion, no petechiae and no rash noted. Not diaphoretic. No cyanosis, erythema, or pallor. Psychiatric: Normal mood and affect. Behavior is normal.    Nursing note and vitals reviewed. Procedures        No orders to display                         Voice dictation software was used during the making of this note. This software is not perfect and grammatical and other typographical errors may be present. This note has not been completely proofread for errors.      Donny Munoz, LONI - CNP  12/28/22 [id number]

## 2022-12-28 NOTE — ED NOTES
Pt ambulated on steady gait to restroom unassisted; pt changed into gown, pelvic supplies at bedside; gave pt blanket     Salazar Mason RN  12/27/22 [id number]

## 2023-02-02 ENCOUNTER — APPOINTMENT (RX ONLY)
Dept: URBAN - METROPOLITAN AREA CLINIC 24 | Facility: CLINIC | Age: 33
Setting detail: DERMATOLOGY
End: 2023-02-02

## 2023-02-02 DIAGNOSIS — L91.8 OTHER HYPERTROPHIC DISORDERS OF THE SKIN: ICD-10-CM

## 2023-02-02 PROCEDURE — ? COUNSELING

## 2023-02-02 PROCEDURE — ? INVENTORY

## 2023-02-02 PROCEDURE — ? BENIGN DESTRUCTION COSMETIC

## 2023-02-02 ASSESSMENT — LOCATION DETAILED DESCRIPTION DERM
LOCATION DETAILED: RIGHT CLAVICULAR NECK
LOCATION DETAILED: RIGHT INFERIOR LATERAL NECK
LOCATION DETAILED: LEFT INFERIOR LATERAL NECK

## 2023-02-02 ASSESSMENT — LOCATION SIMPLE DESCRIPTION DERM
LOCATION SIMPLE: LEFT ANTERIOR NECK
LOCATION SIMPLE: RIGHT ANTERIOR NECK

## 2023-02-02 ASSESSMENT — LOCATION ZONE DERM: LOCATION ZONE: NECK

## 2023-02-10 ENCOUNTER — TELEPHONE (OUTPATIENT)
Dept: NUTRITION | Age: 33
End: 2023-02-10

## 2023-02-10 NOTE — TELEPHONE ENCOUNTER
Nutrition Counseling: Contacted pt regarding referral. See notes documented in Nutrition Counseling Referral for details. No further follow-up contact from pt. Will close referral for this office and notify referring provider.     [facility]  [phone]

## 2023-03-28 ENCOUNTER — OFFICE VISIT (OUTPATIENT)
Dept: ENT CLINIC | Age: 33
End: 2023-03-28
Payer: COMMERCIAL

## 2023-03-28 VITALS
WEIGHT: 212.4 LBS | BODY MASS INDEX: 32.19 KG/M2 | SYSTOLIC BLOOD PRESSURE: 110 MMHG | HEIGHT: 68 IN | DIASTOLIC BLOOD PRESSURE: 70 MMHG

## 2023-03-28 DIAGNOSIS — Z3A.15 15 WEEKS GESTATION OF PREGNANCY: ICD-10-CM

## 2023-03-28 DIAGNOSIS — H81.10 BENIGN PAROXYSMAL POSITIONAL VERTIGO, UNSPECIFIED LATERALITY: ICD-10-CM

## 2023-03-28 DIAGNOSIS — H93.13 TINNITUS OF BOTH EARS: Primary | Chronic | ICD-10-CM

## 2023-03-28 DIAGNOSIS — H93.13 TINNITUS OF BOTH EARS: Primary | ICD-10-CM

## 2023-03-28 DIAGNOSIS — J34.3 NASAL TURBINATE HYPERTROPHY: ICD-10-CM

## 2023-03-28 DIAGNOSIS — H69.83 DYSFUNCTION OF BOTH EUSTACHIAN TUBES: Chronic | ICD-10-CM

## 2023-03-28 PROCEDURE — 92567 TYMPANOMETRY: CPT | Performed by: AUDIOLOGIST

## 2023-03-28 PROCEDURE — 99204 OFFICE O/P NEW MOD 45 MIN: CPT | Performed by: PHYSICIAN ASSISTANT

## 2023-03-28 PROCEDURE — 92557 COMPREHENSIVE HEARING TEST: CPT | Performed by: AUDIOLOGIST

## 2023-03-28 RX ORDER — LANCETS 28 GAUGE
EACH MISCELLANEOUS
COMMUNITY
Start: 2023-02-07

## 2023-03-28 RX ORDER — DIPHENHYDRAMINE HCL 25 MG
TABLET ORAL
COMMUNITY
Start: 2023-02-07

## 2023-03-28 RX ORDER — IBUPROFEN 800 MG/1
800 TABLET ORAL EVERY 6 HOURS PRN
COMMUNITY
Start: 2019-12-24

## 2023-03-28 RX ORDER — CALCIUM CITRATE/VITAMIN D3 200MG-6.25
TABLET ORAL
COMMUNITY
Start: 2023-02-07

## 2023-03-28 RX ORDER — VENLAFAXINE HYDROCHLORIDE 37.5 MG/1
CAPSULE, EXTENDED RELEASE ORAL
COMMUNITY
Start: 2023-03-16

## 2023-03-28 RX ORDER — AZELASTINE HYDROCHLORIDE 137 UG/1
1 SPRAY, METERED NASAL 2 TIMES DAILY
Qty: 1 EACH | Refills: 2 | Status: SHIPPED | OUTPATIENT
Start: 2023-03-28

## 2023-03-28 ASSESSMENT — ENCOUNTER SYMPTOMS
ABDOMINAL PAIN: 0
COUGH: 0
EYE DISCHARGE: 0

## 2023-03-28 NOTE — PROGRESS NOTES
AUDIOLOGY EVALUATION    Carrillo Gomez had Tympanometry and Audiometry performed today. The patient reports tinnitus. Results as follows:    Tympanometry    Type A -  bilaterally    Audiometry    Test Performed - Comprehensive Audiogram    Type of Loss - Right Ear: normal hearing                          Left Ear: normal hearing    SRT   Measurement Right Ear Left Ear   Value 5 5   Unit dB dB     Discrimination  Measurement Right Ear Left Ear   Value 100% 100%   Unit dB dB     Recommend  Retest as clinically indicated    [id number] [employer]  Audiologist

## 2023-03-28 NOTE — PATIENT INSTRUCTIONS
Astelin will be sent to your pharmacy    Otovent : over the counter use 3 times a day until I see you back. Afrin : use for 3-5 days 2 x a day, then stop. Sinus Rinse: recommend [facility]  once daily.

## 2023-03-28 NOTE — PROGRESS NOTES
Disp-1 each, R-2Normal  4. Benign paroxysmal positional vertigo, unspecified laterality  5. Dysfunction of both eustachian tubes    Patient has resolved BPPV responded appropriately to the Epley maneuver and has had no reoccurrence. She is now 15 weeks pregnant and has complained of chronic nasal congestion. Her ears feel like there is pressure bilaterally and she cannot pop them enough to clear. Would recommend daily use of Astelin and nasal sinus rinse. We discussed the use of baby shampoo with particularly congested during pregnancy but not to be used daily for longer than 3 weeks. Audiogram is wnl. Return if symptoms worsen or fail to improve. Patient agrees with this plan. GUILHERME Soliman    This note was generated using voice recognition software, please excuse any typos.

## 2023-10-13 NOTE — PROCEDURE: PUNCH EXCISION
Epidermal Sutures: 4-0 Prolene
no
X Size Of Lesion Width In Cm (Optional): 0.6
Complex Requirements: Retention Sutures?: No
Anesthesia Volume In Cc: 3
5 Mm Punch Excision Text: A 5 mm punch biopsy was used to excise the lesion to the level of the subcutaneous fat.  Blunt dissection was used to free the lesion from the surrounding tissues and the lesion was removed.
Wound Care: Vaseline
Detail Level: Detailed
8 Mm Punch Excision Text: A 8 mm punch biopsy was used to excise the lesion to the level of the subcutaneous fat.  Blunt dissection was used to free the lesion from the surrounding tissues and the lesion was removed.
Punch Size In Mm: 8
Bill For Surgical Tray: yes
1.5 Mm Punch Excision Text: A 1.5 mm punch biopsy was used to excise the lesion to the level of the subcutaneous fat.  Blunt dissection was used to free the lesion from the surrounding tissues and the lesion was removed.
Consent was obtained from the patient. The risks and benefits to therapy were discussed in detail. Specifically, the risks of infection, scarring, bleeding, prolonged wound healing, incomplete removal, allergy to anesthesia, nerve injury and recurrence were addressed. Prior to the procedure, the treatment site was clearly identified and confirmed by the patient.
Suture Removal: 14 days
Path Notes (To The Dermatopathologist): Please check margins.
3 Mm Punch Excision Text: A 3 mm punch biopsy was used to excise the lesion to the level of the subcutaneous fat.  Blunt dissection was used to free the lesion from the surrounding tissues and the lesion was removed.
Hemostasis: Pressure
Anesthesia Type: 1% lidocaine with epinephrine
Nostril Rim Text: The closure involved the nostril rim.
Notification Instructions: Patient will be notified of biopsy results. However, patient instructed to call the office if not contacted within 2 weeks.
4.5 Mm Punch Excision Text: A 4.5 mm punch biopsy was used to excise the lesion to the level of the subcutaneous fat.  Blunt dissection was used to free the lesion from the surrounding tissues and the lesion was removed.
Size Of Lesion (*Required): 0.4
Medical Necessity Clause: This procedure was medically necessary because the lesion that was treated was:
Intermediate / Complex Repair - Final Wound Length In Cm: 1.3
2.5 Mm Punch Excision Text: A 2.5 mm punch biopsy was used to excise the lesion to the level of the subcutaneous fat.  Blunt dissection was used to free the lesion from the surrounding tissues and the lesion was removed.
Billing Type: Third-Party Bill
7 Mm Punch Excision Text: A 7 mm punch biopsy was used to excise the lesion to the level of the subcutaneous fat.  Blunt dissection was used to free the lesion from the surrounding tissues and the lesion was removed.
X Depth Of Punch In Cm (Optional): 0
Wound Dressings: a bandage
Post-Care Instructions: I reviewed with the patient in detail post-care instructions. Patient is to keep the biopsy site dry overnight, and then apply bacitracin twice daily until healed. Patient may apply hydrogen peroxide soaks to remove any crusting.
Vermilion Border Text: The closure involved the vermilion border.
4 Mm Punch Excision Text: A 4 mm punch biopsy was used to excise the lesion to the level of the subcutaneous fat.  Blunt dissection was used to free the lesion from the surrounding tissues and the lesion was removed.
12 Mm Punch Excision Text: A 12 mm punch biopsy was used to excise the lesion to the level of the subcutaneous fat.  Blunt dissection was used to free the lesion from the surrounding tissues and the lesion was removed.
Debridement Text: The wound edges were debrided prior to proceeding with the closure to facilitate wound healing.
2 Mm Punch Excision Text: A 2 mm punch biopsy was used to excise the lesion to the level of the subcutaneous fat.  Blunt dissection was used to free the lesion from the surrounding tissues and the lesion was removed.
6 Mm Punch Excision Text: A 6 mm punch biopsy was used to excise the lesion to the level of the subcutaneous fat.  Blunt dissection was used to free the lesion from the surrounding tissues and the lesion was removed.
Undermining Type: Entire Wound
Size Of Margin In Cm: 0.2
Retention Suture Text: Retention sutures were placed to support the closure and prevent dehiscence.
3.5 Mm Punch Excision Text: A 3.5 mm punch biopsy was used to excise the lesion to the level of the subcutaneous fat.  Blunt dissection was used to free the lesion from the surrounding tissues and the lesion was removed.
Helical Rim Text: The closure involved the helical rim.
10 Mm Punch Excision Text: A 10 mm punch biopsy was used to excise the lesion to the level of the subcutaneous fat.  Blunt dissection was used to free the lesion from the surrounding tissues and the lesion was removed.
Repair Type: Intermediate